# Patient Record
Sex: FEMALE | Race: BLACK OR AFRICAN AMERICAN | NOT HISPANIC OR LATINO | URBAN - METROPOLITAN AREA
[De-identification: names, ages, dates, MRNs, and addresses within clinical notes are randomized per-mention and may not be internally consistent; named-entity substitution may affect disease eponyms.]

---

## 2019-09-27 ENCOUNTER — INPATIENT (INPATIENT)
Facility: HOSPITAL | Age: 69
LOS: 2 days | Discharge: LEFT BEFORE DISCHARGE | DRG: 194 | End: 2019-09-30
Attending: INTERNAL MEDICINE | Admitting: INTERNAL MEDICINE
Payer: MEDICARE

## 2019-09-27 VITALS
RESPIRATION RATE: 18 BRPM | HEART RATE: 102 BPM | TEMPERATURE: 99 F | DIASTOLIC BLOOD PRESSURE: 69 MMHG | OXYGEN SATURATION: 99 % | SYSTOLIC BLOOD PRESSURE: 99 MMHG

## 2019-09-27 DIAGNOSIS — Z98.890 OTHER SPECIFIED POSTPROCEDURAL STATES: Chronic | ICD-10-CM

## 2019-09-27 LAB
ALBUMIN SERPL ELPH-MCNC: 3.5 G/DL — SIGNIFICANT CHANGE UP (ref 3.4–5)
ALP SERPL-CCNC: 41 U/L — SIGNIFICANT CHANGE UP (ref 40–120)
ALT FLD-CCNC: 16 U/L — SIGNIFICANT CHANGE UP (ref 12–42)
ANION GAP SERPL CALC-SCNC: 10 MMOL/L — SIGNIFICANT CHANGE UP (ref 9–16)
APPEARANCE UR: CLEAR — SIGNIFICANT CHANGE UP
APTT BLD: 28.9 SEC — SIGNIFICANT CHANGE UP (ref 27.5–36.3)
AST SERPL-CCNC: 44 U/L — HIGH (ref 15–37)
BILIRUB SERPL-MCNC: 2.1 MG/DL — HIGH (ref 0.2–1.2)
BILIRUB UR-MCNC: ABNORMAL
BUN SERPL-MCNC: 28 MG/DL — HIGH (ref 7–23)
CALCIUM SERPL-MCNC: 9 MG/DL — SIGNIFICANT CHANGE UP (ref 8.5–10.5)
CHLORIDE SERPL-SCNC: 107 MMOL/L — SIGNIFICANT CHANGE UP (ref 96–108)
CK SERPL-CCNC: 242 U/L — HIGH (ref 26–192)
CO2 SERPL-SCNC: 24 MMOL/L — SIGNIFICANT CHANGE UP (ref 22–31)
COLOR SPEC: YELLOW — SIGNIFICANT CHANGE UP
CREAT SERPL-MCNC: 1.53 MG/DL — HIGH (ref 0.5–1.3)
D DIMER BLD IA.RAPID-MCNC: HIGH NG/ML DDU
DIFF PNL FLD: ABNORMAL
GLUCOSE SERPL-MCNC: 96 MG/DL — SIGNIFICANT CHANGE UP (ref 70–99)
GLUCOSE UR QL: NEGATIVE — SIGNIFICANT CHANGE UP
HCT VFR BLD CALC: 28.7 % — LOW (ref 34.5–45)
HCT VFR BLD CALC: 29.4 % — LOW (ref 34.5–45)
HGB BLD-MCNC: 9.4 G/DL — LOW (ref 11.5–15.5)
HGB BLD-MCNC: 9.7 G/DL — LOW (ref 11.5–15.5)
INR BLD: 1.15 — SIGNIFICANT CHANGE UP (ref 0.88–1.16)
KETONES UR-MCNC: ABNORMAL MG/DL
LACTATE SERPL-SCNC: 0.7 MMOL/L — SIGNIFICANT CHANGE UP (ref 0.4–2)
LEUKOCYTE ESTERASE UR-ACNC: NEGATIVE — SIGNIFICANT CHANGE UP
MCHC RBC-ENTMCNC: 32.8 G/DL — SIGNIFICANT CHANGE UP (ref 32–36)
MCHC RBC-ENTMCNC: 33 G/DL — SIGNIFICANT CHANGE UP (ref 32–36)
MCHC RBC-ENTMCNC: 41.4 PG — HIGH (ref 27–34)
MCHC RBC-ENTMCNC: 42.2 PG — HIGH (ref 27–34)
MCV RBC AUTO: 126.4 FL — HIGH (ref 80–100)
MCV RBC AUTO: 127.8 FL — HIGH (ref 80–100)
NITRITE UR-MCNC: NEGATIVE — SIGNIFICANT CHANGE UP
NT-PROBNP SERPL-SCNC: 4946 PG/ML — HIGH
PCP SPEC-MCNC: SIGNIFICANT CHANGE UP
PH UR: 5.5 — SIGNIFICANT CHANGE UP (ref 5–8)
PLATELET # BLD AUTO: 183 K/UL — SIGNIFICANT CHANGE UP (ref 150–400)
PLATELET # BLD AUTO: 195 K/UL — SIGNIFICANT CHANGE UP (ref 150–400)
POTASSIUM SERPL-MCNC: 4.7 MMOL/L — SIGNIFICANT CHANGE UP (ref 3.5–5.3)
POTASSIUM SERPL-SCNC: 4.7 MMOL/L — SIGNIFICANT CHANGE UP (ref 3.5–5.3)
PROT SERPL-MCNC: 8.8 G/DL — HIGH (ref 6.4–8.2)
PROT UR-MCNC: 100 MG/DL
PROTHROM AB SERPL-ACNC: 12.7 SEC — SIGNIFICANT CHANGE UP (ref 10–12.9)
RBC # BLD: 2.27 M/UL — LOW (ref 3.8–5.2)
RBC # BLD: 2.3 M/UL — LOW (ref 3.8–5.2)
RBC # FLD: 16.5 % — HIGH (ref 10.3–14.5)
RBC # FLD: 16.8 % — HIGH (ref 10.3–14.5)
SODIUM SERPL-SCNC: 141 MMOL/L — SIGNIFICANT CHANGE UP (ref 132–145)
SP GR SPEC: >=1.03 — SIGNIFICANT CHANGE UP (ref 1–1.03)
TROPONIN I SERPL-MCNC: <0.017 NG/ML — LOW (ref 0.02–0.06)
UROBILINOGEN FLD QL: 1 E.U./DL — SIGNIFICANT CHANGE UP
WBC # BLD: 6.4 K/UL — SIGNIFICANT CHANGE UP (ref 3.8–10.5)
WBC # BLD: 7.3 K/UL — SIGNIFICANT CHANGE UP (ref 3.8–10.5)
WBC # FLD AUTO: 6.4 K/UL — SIGNIFICANT CHANGE UP (ref 3.8–10.5)
WBC # FLD AUTO: 7.3 K/UL — SIGNIFICANT CHANGE UP (ref 3.8–10.5)

## 2019-09-27 PROCEDURE — 71260 CT THORAX DX C+: CPT | Mod: 26

## 2019-09-27 PROCEDURE — 99218: CPT

## 2019-09-27 PROCEDURE — 70450 CT HEAD/BRAIN W/O DYE: CPT | Mod: 26

## 2019-09-27 PROCEDURE — 74177 CT ABD & PELVIS W/CONTRAST: CPT | Mod: 26

## 2019-09-27 PROCEDURE — 93010 ELECTROCARDIOGRAM REPORT: CPT

## 2019-09-27 RX ORDER — SODIUM CHLORIDE 9 MG/ML
1000 INJECTION INTRAMUSCULAR; INTRAVENOUS; SUBCUTANEOUS ONCE
Refills: 0 | Status: COMPLETED | OUTPATIENT
Start: 2019-09-27 | End: 2019-09-27

## 2019-09-27 RX ORDER — AZITHROMYCIN 500 MG/1
500 TABLET, FILM COATED ORAL ONCE
Refills: 0 | Status: COMPLETED | OUTPATIENT
Start: 2019-09-27 | End: 2019-09-27

## 2019-09-27 RX ORDER — AZITHROMYCIN 500 MG/1
500 TABLET, FILM COATED ORAL EVERY 24 HOURS
Refills: 0 | Status: DISCONTINUED | OUTPATIENT
Start: 2019-09-28 | End: 2019-09-28

## 2019-09-27 RX ORDER — CEFTRIAXONE 500 MG/1
1000 INJECTION, POWDER, FOR SOLUTION INTRAMUSCULAR; INTRAVENOUS EVERY 24 HOURS
Refills: 0 | Status: COMPLETED | OUTPATIENT
Start: 2019-09-27 | End: 2019-09-27

## 2019-09-27 RX ORDER — AZITHROMYCIN 500 MG/1
TABLET, FILM COATED ORAL
Refills: 0 | Status: DISCONTINUED | OUTPATIENT
Start: 2019-09-27 | End: 2019-09-28

## 2019-09-27 RX ORDER — ASPIRIN/CALCIUM CARB/MAGNESIUM 324 MG
81 TABLET ORAL DAILY
Refills: 0 | Status: DISCONTINUED | OUTPATIENT
Start: 2019-09-27 | End: 2019-09-27

## 2019-09-27 RX ORDER — IOHEXOL 300 MG/ML
30 INJECTION, SOLUTION INTRAVENOUS ONCE
Refills: 0 | Status: COMPLETED | OUTPATIENT
Start: 2019-09-27 | End: 2019-09-27

## 2019-09-27 RX ADMIN — IOHEXOL 30 MILLILITER(S): 300 INJECTION, SOLUTION INTRAVENOUS at 16:35

## 2019-09-27 RX ADMIN — SODIUM CHLORIDE 1000 MILLILITER(S): 9 INJECTION INTRAMUSCULAR; INTRAVENOUS; SUBCUTANEOUS at 16:34

## 2019-09-27 RX ADMIN — Medication 81 MILLIGRAM(S): at 16:34

## 2019-09-27 RX ADMIN — SODIUM CHLORIDE 250 MILLILITER(S): 9 INJECTION INTRAMUSCULAR; INTRAVENOUS; SUBCUTANEOUS at 17:56

## 2019-09-27 RX ADMIN — AZITHROMYCIN 255 MILLIGRAM(S): 500 TABLET, FILM COATED ORAL at 23:56

## 2019-09-27 RX ADMIN — SODIUM CHLORIDE 1000 MILLILITER(S): 9 INJECTION INTRAMUSCULAR; INTRAVENOUS; SUBCUTANEOUS at 17:50

## 2019-09-27 RX ADMIN — SODIUM CHLORIDE 1000 MILLILITER(S): 9 INJECTION INTRAMUSCULAR; INTRAVENOUS; SUBCUTANEOUS at 19:00

## 2019-09-27 RX ADMIN — CEFTRIAXONE 100 MILLIGRAM(S): 500 INJECTION, POWDER, FOR SOLUTION INTRAMUSCULAR; INTRAVENOUS at 23:26

## 2019-09-27 NOTE — ED ADULT NURSE NOTE - NSIMPLEMENTINTERV_GEN_ALL_ED
Implemented All Fall with Harm Risk Interventions:  [place] to call system. Call bell, personal items and telephone within reach. Instruct patient to call for assistance. Room bathroom lighting operational. Non-slip footwear when patient is off stretcher. Physically safe environment: no spills, clutter or unnecessary equipment. Stretcher in lowest position, wheels locked, appropriate side rails in place. Provide visual cue, wrist band, yellow gown, etc. Monitor gait and stability. Monitor for mental status changes and reorient to person, place, and time. Review medications for side effects contributing to fall risk. Reinforce activity limits and safety measures with patient and family. Provide visual clues: red socks.

## 2019-09-27 NOTE — ED PROVIDER NOTE - OBJECTIVE STATEMENT
nearly passed out and also hit forehead when she fell today, recent abdominal surgery and has a lapartoromy scar but she cannot recall why she had the surgery, no fevers or chills, no nausea or vomiting, states the only meds she takes are Tylenol and Ibuprofen

## 2019-09-27 NOTE — ED PROVIDER NOTE - CARE PLAN
Principal Discharge DX:	Pre-syncope  Secondary Diagnosis:	H/O abdominal surgery  Secondary Diagnosis:	Anemia

## 2019-09-27 NOTE — ED ADULT TRIAGE NOTE - CHIEF COMPLAINT QUOTE
[place] c/o dizziness. States she fell and hit her head but insists she did not syncopize. Appears disheveled denies concussive sx is AAOx2, unsure of baseline mental status, oral temp 99.4

## 2019-09-27 NOTE — ED PROVIDER NOTE - CLINICAL SUMMARY MEDICAL DECISION MAKING FREE TEXT BOX
nearly passed out and also hit forehead when she fell today, recent abdominal surgery and has a lapartoromy scar but she cannot recall why she had the surgery, no fevers or chills, no nausea or vomiting, states the only meds she takes are Tylenol and Ibuprofen, patient has periumbilical abd pain and recent surgery so will CT, pt with head injury so will CT head, patient with andmeia and EKG changes, will order troponin and repeat, will place in CDU for diagnostic uncertainty and intensity

## 2019-09-27 NOTE — ED CDU PROVIDER DISPOSITION NOTE - CLINICAL COURSE
patient with syncope, head injury, anemia, RLL pneumonia, admit to Dr Escobar and Dr Munguia, also d/w Dr Saleem of medical telemetry and his team will eval on floor

## 2019-09-27 NOTE — ED ADULT NURSE NOTE - OBJECTIVE STATEMENT
s/p dizziness . unsure of syncope episode. pt with head injury. unsure of baseline mental ststaus. pt ia aox2. no complaints at this  time

## 2019-09-28 DIAGNOSIS — N17.9 ACUTE KIDNEY FAILURE, UNSPECIFIED: ICD-10-CM

## 2019-09-28 DIAGNOSIS — D64.9 ANEMIA, UNSPECIFIED: ICD-10-CM

## 2019-09-28 DIAGNOSIS — R09.89 OTHER SPECIFIED SYMPTOMS AND SIGNS INVOLVING THE CIRCULATORY AND RESPIRATORY SYSTEMS: ICD-10-CM

## 2019-09-28 DIAGNOSIS — J18.1 LOBAR PNEUMONIA, UNSPECIFIED ORGANISM: ICD-10-CM

## 2019-09-28 DIAGNOSIS — R55 SYNCOPE AND COLLAPSE: ICD-10-CM

## 2019-09-28 LAB
ALBUMIN SERPL ELPH-MCNC: 3.5 G/DL — SIGNIFICANT CHANGE UP (ref 3.3–5)
ALP SERPL-CCNC: 35 U/L — LOW (ref 40–120)
ALT FLD-CCNC: 10 U/L — SIGNIFICANT CHANGE UP (ref 10–45)
ANION GAP SERPL CALC-SCNC: 10 MMOL/L — SIGNIFICANT CHANGE UP (ref 5–17)
ANION GAP SERPL CALC-SCNC: 11 MMOL/L — SIGNIFICANT CHANGE UP (ref 5–17)
APPEARANCE UR: CLEAR — SIGNIFICANT CHANGE UP
APTT BLD: 28 SEC — SIGNIFICANT CHANGE UP (ref 27.5–36.3)
APTT BLD: 28.7 SEC — SIGNIFICANT CHANGE UP (ref 27.5–36.3)
AST SERPL-CCNC: 18 U/L — SIGNIFICANT CHANGE UP (ref 10–40)
BASOPHILS # BLD AUTO: 0.01 K/UL — SIGNIFICANT CHANGE UP (ref 0–0.2)
BASOPHILS # BLD AUTO: 0.01 K/UL — SIGNIFICANT CHANGE UP (ref 0–0.2)
BASOPHILS NFR BLD AUTO: 0.2 % — SIGNIFICANT CHANGE UP (ref 0–2)
BASOPHILS NFR BLD AUTO: 0.2 % — SIGNIFICANT CHANGE UP (ref 0–2)
BILIRUB SERPL-MCNC: 1.2 MG/DL — SIGNIFICANT CHANGE UP (ref 0.2–1.2)
BILIRUB UR-MCNC: NEGATIVE — SIGNIFICANT CHANGE UP
BLD GP AB SCN SERPL QL: NEGATIVE — SIGNIFICANT CHANGE UP
BLD GP AB SCN SERPL QL: NEGATIVE — SIGNIFICANT CHANGE UP
BUN SERPL-MCNC: 22 MG/DL — SIGNIFICANT CHANGE UP (ref 7–23)
BUN SERPL-MCNC: 25 MG/DL — HIGH (ref 7–23)
CALCIUM SERPL-MCNC: 8.3 MG/DL — LOW (ref 8.4–10.5)
CALCIUM SERPL-MCNC: 8.4 MG/DL — SIGNIFICANT CHANGE UP (ref 8.4–10.5)
CHLORIDE SERPL-SCNC: 107 MMOL/L — SIGNIFICANT CHANGE UP (ref 96–108)
CHLORIDE SERPL-SCNC: 112 MMOL/L — HIGH (ref 96–108)
CK SERPL-CCNC: 218 U/L — HIGH (ref 26–192)
CO2 SERPL-SCNC: 21 MMOL/L — LOW (ref 22–31)
CO2 SERPL-SCNC: 21 MMOL/L — LOW (ref 22–31)
COLOR SPEC: YELLOW — SIGNIFICANT CHANGE UP
CREAT SERPL-MCNC: 1.28 MG/DL — SIGNIFICANT CHANGE UP (ref 0.5–1.3)
CREAT SERPL-MCNC: 1.36 MG/DL — HIGH (ref 0.5–1.3)
DIFF PNL FLD: ABNORMAL
EOSINOPHIL # BLD AUTO: 0 K/UL — SIGNIFICANT CHANGE UP (ref 0–0.5)
EOSINOPHIL # BLD AUTO: 0 K/UL — SIGNIFICANT CHANGE UP (ref 0–0.5)
EOSINOPHIL NFR BLD AUTO: 0 % — SIGNIFICANT CHANGE UP (ref 0–6)
EOSINOPHIL NFR BLD AUTO: 0 % — SIGNIFICANT CHANGE UP (ref 0–6)
GLUCOSE BLDC GLUCOMTR-MCNC: 91 MG/DL — SIGNIFICANT CHANGE UP (ref 70–99)
GLUCOSE SERPL-MCNC: 84 MG/DL — SIGNIFICANT CHANGE UP (ref 70–99)
GLUCOSE SERPL-MCNC: 91 MG/DL — SIGNIFICANT CHANGE UP (ref 70–99)
GLUCOSE UR QL: NEGATIVE — SIGNIFICANT CHANGE UP
HBA1C BLD-MCNC: 5.2 % — SIGNIFICANT CHANGE UP (ref 4–5.6)
HCT VFR BLD CALC: 24.1 % — LOW (ref 34.5–45)
HCT VFR BLD CALC: 25.8 % — LOW (ref 34.5–45)
HCV AB S/CO SERPL IA: 0.1 S/CO — SIGNIFICANT CHANGE UP
HCV AB SERPL-IMP: SIGNIFICANT CHANGE UP
HGB BLD-MCNC: 7.9 G/DL — LOW (ref 11.5–15.5)
HGB BLD-MCNC: 8.4 G/DL — LOW (ref 11.5–15.5)
IMM GRANULOCYTES NFR BLD AUTO: 0.7 % — SIGNIFICANT CHANGE UP (ref 0–1.5)
IMM GRANULOCYTES NFR BLD AUTO: 0.9 % — SIGNIFICANT CHANGE UP (ref 0–1.5)
INR BLD: 1.19 — HIGH (ref 0.88–1.16)
INR BLD: 1.21 — HIGH (ref 0.88–1.16)
KETONES UR-MCNC: NEGATIVE — SIGNIFICANT CHANGE UP
LEUKOCYTE ESTERASE UR-ACNC: NEGATIVE — SIGNIFICANT CHANGE UP
LYMPHOCYTES # BLD AUTO: 0.57 K/UL — LOW (ref 1–3.3)
LYMPHOCYTES # BLD AUTO: 0.57 K/UL — LOW (ref 1–3.3)
LYMPHOCYTES # BLD AUTO: 12.6 % — LOW (ref 13–44)
LYMPHOCYTES # BLD AUTO: 14.1 % — SIGNIFICANT CHANGE UP (ref 13–44)
MAGNESIUM SERPL-MCNC: 1.6 MG/DL — SIGNIFICANT CHANGE UP (ref 1.6–2.6)
MAGNESIUM SERPL-MCNC: 1.7 MG/DL — SIGNIFICANT CHANGE UP (ref 1.6–2.6)
MCHC RBC-ENTMCNC: 32.6 GM/DL — SIGNIFICANT CHANGE UP (ref 32–36)
MCHC RBC-ENTMCNC: 32.8 GM/DL — SIGNIFICANT CHANGE UP (ref 32–36)
MCHC RBC-ENTMCNC: 41.8 PG — HIGH (ref 27–34)
MCHC RBC-ENTMCNC: 42 PG — HIGH (ref 27–34)
MCV RBC AUTO: 127.5 FL — HIGH (ref 80–100)
MCV RBC AUTO: 129 FL — HIGH (ref 80–100)
MONOCYTES # BLD AUTO: 0.24 K/UL — SIGNIFICANT CHANGE UP (ref 0–0.9)
MONOCYTES # BLD AUTO: 0.33 K/UL — SIGNIFICANT CHANGE UP (ref 0–0.9)
MONOCYTES NFR BLD AUTO: 5.3 % — SIGNIFICANT CHANGE UP (ref 2–14)
MONOCYTES NFR BLD AUTO: 8.1 % — SIGNIFICANT CHANGE UP (ref 2–14)
NEUTROPHILS # BLD AUTO: 3.11 K/UL — SIGNIFICANT CHANGE UP (ref 1.8–7.4)
NEUTROPHILS # BLD AUTO: 3.67 K/UL — SIGNIFICANT CHANGE UP (ref 1.8–7.4)
NEUTROPHILS NFR BLD AUTO: 76.9 % — SIGNIFICANT CHANGE UP (ref 43–77)
NEUTROPHILS NFR BLD AUTO: 81 % — HIGH (ref 43–77)
NITRITE UR-MCNC: NEGATIVE — SIGNIFICANT CHANGE UP
NRBC # BLD: 0 /100 WBCS — SIGNIFICANT CHANGE UP (ref 0–0)
NRBC # BLD: 0 /100 WBCS — SIGNIFICANT CHANGE UP (ref 0–0)
OB PNL STL: NEGATIVE — SIGNIFICANT CHANGE UP
PH UR: 5.5 — SIGNIFICANT CHANGE UP (ref 5–8)
PHOSPHATE SERPL-MCNC: 3.9 MG/DL — SIGNIFICANT CHANGE UP (ref 2.5–4.5)
PHOSPHATE SERPL-MCNC: 4 MG/DL — SIGNIFICANT CHANGE UP (ref 2.5–4.5)
PLATELET # BLD AUTO: 152 K/UL — SIGNIFICANT CHANGE UP (ref 150–400)
PLATELET # BLD AUTO: 174 K/UL — SIGNIFICANT CHANGE UP (ref 150–400)
POTASSIUM SERPL-MCNC: 3.8 MMOL/L — SIGNIFICANT CHANGE UP (ref 3.5–5.3)
POTASSIUM SERPL-MCNC: 4 MMOL/L — SIGNIFICANT CHANGE UP (ref 3.5–5.3)
POTASSIUM SERPL-SCNC: 3.8 MMOL/L — SIGNIFICANT CHANGE UP (ref 3.5–5.3)
POTASSIUM SERPL-SCNC: 4 MMOL/L — SIGNIFICANT CHANGE UP (ref 3.5–5.3)
PROT SERPL-MCNC: 7.3 G/DL — SIGNIFICANT CHANGE UP (ref 6–8.3)
PROT UR-MCNC: 30 MG/DL
PROTHROM AB SERPL-ACNC: 13.5 SEC — HIGH (ref 10–12.9)
PROTHROM AB SERPL-ACNC: 13.8 SEC — HIGH (ref 10–12.9)
RBC # BLD: 1.89 M/UL — LOW (ref 3.8–5.2)
RBC # BLD: 2 M/UL — LOW (ref 3.8–5.2)
RBC # FLD: 16.2 % — HIGH (ref 10.3–14.5)
RBC # FLD: 16.4 % — HIGH (ref 10.3–14.5)
RH IG SCN BLD-IMP: POSITIVE — SIGNIFICANT CHANGE UP
RH IG SCN BLD-IMP: POSITIVE — SIGNIFICANT CHANGE UP
SODIUM SERPL-SCNC: 139 MMOL/L — SIGNIFICANT CHANGE UP (ref 135–145)
SODIUM SERPL-SCNC: 143 MMOL/L — SIGNIFICANT CHANGE UP (ref 135–145)
SP GR SPEC: 1.01 — SIGNIFICANT CHANGE UP (ref 1–1.03)
TROPONIN I SERPL-MCNC: <0.017 NG/ML — LOW (ref 0.02–0.06)
TROPONIN T SERPL-MCNC: <0.01 NG/ML — SIGNIFICANT CHANGE UP (ref 0–0.01)
UROBILINOGEN FLD QL: 0.2 E.U./DL — SIGNIFICANT CHANGE UP
WBC # BLD: 4.05 K/UL — SIGNIFICANT CHANGE UP (ref 3.8–10.5)
WBC # BLD: 4.53 K/UL — SIGNIFICANT CHANGE UP (ref 3.8–10.5)
WBC # FLD AUTO: 4.05 K/UL — SIGNIFICANT CHANGE UP (ref 3.8–10.5)
WBC # FLD AUTO: 4.53 K/UL — SIGNIFICANT CHANGE UP (ref 3.8–10.5)

## 2019-09-28 PROCEDURE — 93010 ELECTROCARDIOGRAM REPORT: CPT

## 2019-09-28 PROCEDURE — 99291 CRITICAL CARE FIRST HOUR: CPT

## 2019-09-28 PROCEDURE — 99223 1ST HOSP IP/OBS HIGH 75: CPT | Mod: GC

## 2019-09-28 PROCEDURE — 93306 TTE W/DOPPLER COMPLETE: CPT | Mod: 26

## 2019-09-28 PROCEDURE — 93970 EXTREMITY STUDY: CPT | Mod: 26

## 2019-09-28 PROCEDURE — 99292 CRITICAL CARE ADDL 30 MIN: CPT | Mod: 25

## 2019-09-28 RX ORDER — ACETAMINOPHEN 500 MG
2 TABLET ORAL
Qty: 0 | Refills: 0 | DISCHARGE

## 2019-09-28 RX ORDER — ACETAMINOPHEN 500 MG
650 TABLET ORAL ONCE
Refills: 0 | Status: COMPLETED | OUTPATIENT
Start: 2019-09-28 | End: 2019-09-28

## 2019-09-28 RX ORDER — AZITHROMYCIN 500 MG/1
500 TABLET, FILM COATED ORAL EVERY 24 HOURS
Refills: 0 | Status: DISCONTINUED | OUTPATIENT
Start: 2019-09-28 | End: 2019-09-30

## 2019-09-28 RX ORDER — CEFTRIAXONE 500 MG/1
1000 INJECTION, POWDER, FOR SOLUTION INTRAMUSCULAR; INTRAVENOUS EVERY 24 HOURS
Refills: 0 | Status: DISCONTINUED | OUTPATIENT
Start: 2019-09-28 | End: 2019-09-30

## 2019-09-28 RX ORDER — IBUPROFEN 200 MG
1 TABLET ORAL
Qty: 0 | Refills: 0 | DISCHARGE

## 2019-09-28 RX ORDER — ACETAMINOPHEN 500 MG
650 TABLET ORAL EVERY 4 HOURS
Refills: 0 | Status: DISCONTINUED | OUTPATIENT
Start: 2019-09-28 | End: 2019-09-30

## 2019-09-28 RX ORDER — INFLUENZA VIRUS VACCINE 15; 15; 15; 15 UG/.5ML; UG/.5ML; UG/.5ML; UG/.5ML
0.5 SUSPENSION INTRAMUSCULAR ONCE
Refills: 0 | Status: DISCONTINUED | OUTPATIENT
Start: 2019-09-28 | End: 2019-09-30

## 2019-09-28 RX ORDER — HEPARIN SODIUM 5000 [USP'U]/ML
5000 INJECTION INTRAVENOUS; SUBCUTANEOUS THREE TIMES A DAY
Refills: 0 | Status: DISCONTINUED | OUTPATIENT
Start: 2019-09-28 | End: 2019-09-28

## 2019-09-28 RX ADMIN — CEFTRIAXONE 100 MILLIGRAM(S): 500 INJECTION, POWDER, FOR SOLUTION INTRAMUSCULAR; INTRAVENOUS at 23:19

## 2019-09-28 RX ADMIN — Medication 650 MILLIGRAM(S): at 07:04

## 2019-09-28 RX ADMIN — AZITHROMYCIN 500 MILLIGRAM(S): 500 TABLET, FILM COATED ORAL at 23:18

## 2019-09-28 RX ADMIN — Medication 650 MILLIGRAM(S): at 17:25

## 2019-09-28 RX ADMIN — Medication 650 MILLIGRAM(S): at 08:47

## 2019-09-28 RX ADMIN — Medication 650 MILLIGRAM(S): at 09:47

## 2019-09-28 RX ADMIN — Medication 650 MILLIGRAM(S): at 08:04

## 2019-09-28 RX ADMIN — Medication 650 MILLIGRAM(S): at 16:39

## 2019-09-28 NOTE — PROGRESS NOTE ADULT - SUBJECTIVE AND OBJECTIVE BOX
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 Patient reportedly was on her phone when she suddenly felt dizzy and fell forward hitting her head, denies LOC, visual changes or headache. Patient denies any preceding N/V, diaphoresis, palpitations, recent PND, orthopnea or LE edema.   In [facility], appeared disheveled and A+O x 2 (baseline unknown), found to have recent abdominal surgery with laparotomy scar but she cannot recall why she had the procedure. Patient admits to mild carla-umbilical pain, denies fever, chills, changes in PO intake or bowel regimen.     In [facility], BP: 99/69, HR:102, RR:18, Temp: 99.4F, O2 sat: 99% RA. EKG revealed Sinus tachycardia@100BPM with TWI in lateral leads, 1mm ST depression in lead III, . Troponin I negative x 2 sets. Labs notable for: H/H 9.4/28.7, D-dimer 11,174, BUN/Cr 28/1.53, BNP 4946. Urine tox negative.   CT head revealed no acute intracranial hemorrhage or transcortical infarct. Small left frontal scalp hematoma. Age-appropriate volume loss and chronic microangiopathic disease.  CT Chest/Abd/pelvis with IV contrast revealed patchy consolidation RLL. This could represent infection or aspiration. No evidence of central PE (as per verbal report from radiology). Dilated bowel loop extending from LUQ into RLQ, appears to be a small bowel loop. This may have been a surgically created bowel loop, as there appears to be surgical suture material surrounding it. It could be ileus or partial obstruction. There is a 5.0 cm low-density lesion in the left kidney that is more dense than a simple cyst. It could be a hyperdense cyst or solid lesion. Large fibroid uterus.    Patient treated with IVFs, Ceftriaxone 1g IV x 1 dose and Azithromycin 500mg IV x 1 dose for CAP. Patient initially transferred to medicine 4URIS, however repeat EKG revealed SR with multifocal P waves vs PAC’s.   Given syncope and abnormal EKG’s, stepped up to 5URIS for cardiac telemetry, serial cardiac enzymes and further cardiac work-up.               	  MEDICATIONS:  azithromycin  IVPB 500 milliGRAM(s) IV Intermittent every 24 hours  acetaminophen   Tablet .. 650 milliGRAM(s) Oral every 4 hours PRN  aluminum hydroxide/magnesium hydroxide/simethicone Suspension 30 milliLiter(s) Oral every 4 hours PRN      	    [PHYSICAL EXAM:  TELEMETRY:  T(C): 36.8 (09-28-19 @ 03:46), Max: 37.4 (09-27-19 @ 15:43)  HR: 107 (09-28-19 @ 03:46) (91 - 107)  BP: 116/75 (09-28-19 @ 03:46) (99/69 - 154/90)  RR: 20 (09-28-19 @ 03:46) (16 - 20)  SpO2: 94% (09-28-19 @ 03:46) (94% - 100%)  Wt(kg): --  I&O's Summary    Height (cm): 175.3 (09-28 @ 01:08)  Weight (kg): 63.9 (09-28 @ 01:08)  BMI (kg/m2): 20.8 (09-28 @ 01:08)  BSA (m2): 1.78 (09-28 @ 01:08)        Appearance: disheveled female in NAD  HEENT:   left frontal scalp hematoma noted  Neck: Supple, no JVD  Cardiovascular: RRR S1S2  Respiratory: RLL rales  Gastrointestinal:  +BS, soft, NT/ND  Extremities: warm, well perfused, no LE edema bilaterally  Neurologic: no focal neurodeficits    	        LABS:	 	  CARDIAC MARKERS:  Troponin I, Serum: <0.017 ng/mL (09-27 @ 23:18)  Troponin I, Serum: <0.017 ng/mL (09-27 @ 16:13)                            8.4    4.53  )-----------( 174      ( 28 Sep 2019 03:43 )             25.8     09-27    141  |  107  |  28<H>  ----------------------------<  96  4.7   |  24  |  1.53<H>    Ca    9.0      27 Sep 2019 16:13    TPro  8.8<H>  /  Alb  3.5  /  TBili  2.1<H>  /  DBili  x   /  AST  44<H>  /  ALT  16  /  AlkPhos  41  09-27    proBNP: Serum Pro-Brain Natriuretic Peptide: 4946 pg/mL (09-27 @ 16:13)    PT/INR - ( 27 Sep 2019 16:13 )   PT: 12.7 sec;   INR: 1.15     PTT - ( 27 Sep 2019 16:13 )  PTT:28.9 sec Cardiology PA Medicine Step Over Acceptance Note             69 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 Patient reportedly was on her phone when she suddenly felt dizzy and fell forward hitting her head, denies LOC, visual changes or headache. Patient denies any preceding N/V, diaphoresis, palpitations, recent PND, orthopnea or LE edema.   In [facility], appeared disheveled and A+O x 2 (baseline unknown), found to have recent abdominal surgery with laparotomy scar but she cannot recall why she had the procedure. Patient admits to mild carla-umbilical pain, denies fever, chills, changes in PO intake or bowel regimen.     In [facility], BP: 99/69, HR:102, RR:18, Temp: 99.4F, O2 sat: 99% RA. EKG revealed Sinus tachycardia@100BPM with TWI in lateral leads, 1mm ST depression in lead III, . Troponin I negative x 2 sets. Labs notable for: H/H 9.4/28.7, D-dimer 11,174, BUN/Cr 28/1.53, BNP 4946. Urine tox negative.   CT head revealed no acute intracranial hemorrhage or transcortical infarct. Small left frontal scalp hematoma. Age-appropriate volume loss and chronic microangiopathic disease.  CT Chest/Abd/pelvis with IV contrast revealed patchy consolidation RLL. This could represent infection or aspiration. No evidence of central PE (as per verbal report from radiology). Dilated bowel loop extending from LUQ into RLQ, appears to be a small bowel loop. This may have been a surgically created bowel loop, as there appears to be surgical suture material surrounding it. It could be ileus or partial obstruction. There is a 5.0 cm low-density lesion in the left kidney that is more dense than a simple cyst. It could be a hyperdense cyst or solid lesion. Large fibroid uterus.    Patient treated with IVFs, Ceftriaxone 1g IV x 1 dose and Azithromycin 500mg IV x 1 dose for CAP. Patient initially transferred to medicine 4URIS, however repeat EKG revealed SR with multifocal P waves vs PAC’s.   Given syncope and abnormal EKG’s, stepped up to 5URIS for cardiac telemetry, serial cardiac enzymes and further cardiac work-up.               	  MEDICATIONS:  azithromycin  IVPB 500 milliGRAM(s) IV Intermittent every 24 hours  acetaminophen   Tablet .. 650 milliGRAM(s) Oral every 4 hours PRN  aluminum hydroxide/magnesium hydroxide/simethicone Suspension 30 milliLiter(s) Oral every 4 hours PRN      	    [PHYSICAL EXAM:  TELEMETRY:  T(C): 36.8 (09-28-19 @ 03:46), Max: 37.4 (09-27-19 @ 15:43)  HR: 107 (09-28-19 @ 03:46) (91 - 107)  BP: 116/75 (09-28-19 @ 03:46) (99/69 - 154/90)  RR: 20 (09-28-19 @ 03:46) (16 - 20)  SpO2: 94% (09-28-19 @ 03:46) (94% - 100%)  Wt(kg): --  I&O's Summary    Height (cm): 175.3 (09-28 @ 01:08)  Weight (kg): 63.9 (09-28 @ 01:08)  BMI (kg/m2): 20.8 (09-28 @ 01:08)  BSA (m2): 1.78 (09-28 @ 01:08)        Appearance: well appearing female in NAD  HEENT:   left frontal scalp hematoma noted  Neck: Supple, no JVD  Cardiovascular: RRR S1S2  Respiratory: RLL rales  Gastrointestinal:  +BS, soft, NT/ND  Extremities: warm, well perfused, no LE edema bilaterally  Neurologic: no focal neurodeficits    	        LABS:	 	  CARDIAC MARKERS:  Troponin I, Serum: <0.017 ng/mL (09-27 @ 23:18)  Troponin I, Serum: <0.017 ng/mL (09-27 @ 16:13)                            8.4    4.53  )-----------( 174      ( 28 Sep 2019 03:43 )             25.8     09-27    141  |  107  |  28<H>  ----------------------------<  96  4.7   |  24  |  1.53<H>    Ca    9.0      27 Sep 2019 16:13    TPro  8.8<H>  /  Alb  3.5  /  TBili  2.1<H>  /  DBili  x   /  AST  44<H>  /  ALT  16  /  AlkPhos  41  09-27    proBNP: Serum Pro-Brain Natriuretic Peptide: 4946 pg/mL (09-27 @ 16:13)    PT/INR - ( 27 Sep 2019 16:13 )   PT: 12.7 sec;   INR: 1.15     PTT - ( 27 Sep 2019 16:13 )  PTT:28.9 sec Cardiology PA Medicine Step Over Acceptance Note            69 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 Patient reportedly was on her phone when she suddenly felt dizzy and fell forward hitting her head, denies LOC, visual changes or headache. Patient denies any preceding N/V, diaphoresis, chest pain, palpitations, recent PND, orthopnea or LE edema. Patient admits to mild carla-umbilical pain, denies fever, chills, changes in PO intake or bowel regimen.   In [facility], BP: 99/69, HR:102, RR:18, Temp: 99.4F, O2 sat: 99% RA. EKG revealed Sinus tachycardia@100BPM with TWI in lateral leads, 1mm ST depression in lead III, . Troponin I negative x 2 sets. Labs notable for: H/H 9.4/28.7, D-dimer 11,174, BUN/Cr 28/1.53, BNP 4946. Urine tox negative.   CT head revealed no acute intracranial hemorrhage or transcortical infarct. Small left frontal scalp hematoma. Age-appropriate volume loss and chronic microangiopathic disease.  CT Chest/Abd/pelvis with IV contrast revealed patchy consolidation RLL. This could represent infection or aspiration. No evidence of central PE (as per verbal report from radiology). Dilated bowel loop extending from LUQ into RLQ, appears to be a small bowel loop. This may have been a surgically created bowel loop, as there appears to be surgical suture material surrounding it. It could be ileus or partial obstruction. There is a 5.0 cm low-density lesion in the left kidney that is more dense than a simple cyst. It could be a hyperdense cyst or solid lesion. Large fibroid uterus.    Patient treated with IVFs, Ceftriaxone 1g IV x 1 dose and Azithromycin 500mg IV x 1 dose for CAP. Patient initially transferred to medicine 4URIS, however repeat EKG revealed SR with multifocal P waves vs PAC’s.   Given syncope and abnormal EKG’s, stepped up to 5URIS for cardiac telemetry, serial cardiac enzymes and further cardiac work-up.               	  MEDICATIONS:  azithromycin  IVPB 500 milliGRAM(s) IV Intermittent every 24 hours  acetaminophen   Tablet .. 650 milliGRAM(s) Oral every 4 hours PRN  aluminum hydroxide/magnesium hydroxide/simethicone Suspension 30 milliLiter(s) Oral every 4 hours PRN      	    [PHYSICAL EXAM:  TELEMETRY: 5 beats of NSVT  T(C): 36.8 (09-28-19 @ 03:46), Max: 37.4 (09-27-19 @ 15:43)  HR: 107 (09-28-19 @ 03:46) (91 - 107)  BP: 116/75 (09-28-19 @ 03:46) (99/69 - 154/90)  RR: 20 (09-28-19 @ 03:46) (16 - 20)  SpO2: 94% (09-28-19 @ 03:46) (94% - 100%)  Wt(kg): --  I&O's Summary    Height (cm): 175.3 (09-28 @ 01:08)  Weight (kg): 63.9 (09-28 @ 01:08)  BMI (kg/m2): 20.8 (09-28 @ 01:08)  BSA (m2): 1.78 (09-28 @ 01:08)        Appearance: well appearing female in NAD  HEENT:   left frontal scalp hematoma noted  Neck: Supple, no JVD  Cardiovascular: RRR S1S2 +III/VI JULIA  Respiratory: CTA upper/mid lobes with RLL rales  Gastrointestinal:  +BS, soft, NT/ND  Extremities: warm, well perfused, no LE edema bilaterally  Neurologic: no focal neurodeficits    	        LABS:	 	  CARDIAC MARKERS:  Troponin I, Serum: <0.017 ng/mL (09-27 @ 23:18)  Troponin I, Serum: <0.017 ng/mL (09-27 @ 16:13)                            8.4    4.53  )-----------( 174      ( 28 Sep 2019 03:43 )             25.8     09-27    141  |  107  |  28<H>  ----------------------------<  96  4.7   |  24  |  1.53<H>    Ca    9.0      27 Sep 2019 16:13    TPro  8.8<H>  /  Alb  3.5  /  TBili  2.1<H>  /  DBili  x   /  AST  44<H>  /  ALT  16  /  AlkPhos  41  09-27    proBNP: Serum Pro-Brain Natriuretic Peptide: 4946 pg/mL (09-27 @ 16:13)    PT/INR - ( 27 Sep 2019 16:13 )   PT: 12.7 sec;   INR: 1.15     PTT - ( 27 Sep 2019 16:13 )  PTT:28.9 sec Cardiology PA Medicine Step Over Acceptance Note            69 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 Patient reportedly was on her phone when she suddenly felt dizzy and fell forward hitting her head, denies LOC, visual changes or headache. Patient denies any preceding N/V, diaphoresis, chest pain, palpitations, recent PND, orthopnea or LE edema. Patient admits to mild carla-umbilical pain, denies fever, chills, changes in PO intake or bowel regimen.   In [facility], BP: 99/69, HR:102, RR:18, Temp: 99.4F, O2 sat: 99% RA. EKG revealed Sinus tachycardia@100BPM with TWI in lateral leads, 1mm ST depression in lead III, . Troponin I negative x 2 sets. Labs notable for: H/H 9.4/28.7, D-dimer 11,174, BUN/Cr 28/1.53, BNP 4946. Urine tox negative.   CT head revealed no acute intracranial hemorrhage or transcortical infarct. Small left frontal scalp hematoma. Age-appropriate volume loss and chronic microangiopathic disease.  CT Chest/Abd/pelvis with IV contrast revealed patchy consolidation RLL. This could represent infection or aspiration. No evidence of central PE (as per verbal report from radiology). Dilated bowel loop extending from LUQ into RLQ, appears to be a small bowel loop. This may have been a surgically created bowel loop, as there appears to be surgical suture material surrounding it. It could be ileus or partial obstruction. There is a 5.0 cm low-density lesion in the left kidney that is more dense than a simple cyst. It could be a hyperdense cyst or solid lesion. Large fibroid uterus.    Patient treated with IVFs, Ceftriaxone 1g IV x 1 dose and Azithromycin 500mg IV x 1 dose for CAP. Patient initially transferred to medicine 4URIS, however repeat EKG revealed SR with wandering atrial pacemaker vs PAC’s.   Given syncope and abnormal EKG’s, stepped up to 5URIS for cardiac telemetry, serial cardiac enzymes and further cardiac work-up.               	  MEDICATIONS:  azithromycin  IVPB 500 milliGRAM(s) IV Intermittent every 24 hours  acetaminophen   Tablet .. 650 milliGRAM(s) Oral every 4 hours PRN  aluminum hydroxide/magnesium hydroxide/simethicone Suspension 30 milliLiter(s) Oral every 4 hours PRN      	    [PHYSICAL EXAM:  TELEMETRY: 5 beats of NSVT  T(C): 36.8 (09-28-19 @ 03:46), Max: 37.4 (09-27-19 @ 15:43)  HR: 107 (09-28-19 @ 03:46) (91 - 107)  BP: 116/75 (09-28-19 @ 03:46) (99/69 - 154/90)  RR: 20 (09-28-19 @ 03:46) (16 - 20)  SpO2: 94% (09-28-19 @ 03:46) (94% - 100%)  Wt(kg): --  I&O's Summary    Height (cm): 175.3 (09-28 @ 01:08)  Weight (kg): 63.9 (09-28 @ 01:08)  BMI (kg/m2): 20.8 (09-28 @ 01:08)  BSA (m2): 1.78 (09-28 @ 01:08)        Appearance: well appearing female in NAD  HEENT:   left frontal scalp hematoma noted  Neck: Supple, no JVD  Cardiovascular: RRR S1S2 +III/VI JULIA  Respiratory: CTA upper/mid lobes with RLL rales  Gastrointestinal:  +BS, soft, NT/ND  Extremities: warm, well perfused, no LE edema bilaterally  Neurologic: no focal neurodeficits    	        LABS:	 	  CARDIAC MARKERS:  Troponin I, Serum: <0.017 ng/mL (09-27 @ 23:18)  Troponin I, Serum: <0.017 ng/mL (09-27 @ 16:13)                            8.4    4.53  )-----------( 174      ( 28 Sep 2019 03:43 )             25.8     09-27    141  |  107  |  28<H>  ----------------------------<  96  4.7   |  24  |  1.53<H>    Ca    9.0      27 Sep 2019 16:13    TPro  8.8<H>  /  Alb  3.5  /  TBili  2.1<H>  /  DBili  x   /  AST  44<H>  /  ALT  16  /  AlkPhos  41  09-27    proBNP: Serum Pro-Brain Natriuretic Peptide: 4946 pg/mL (09-27 @ 16:13)    PT/INR - ( 27 Sep 2019 16:13 )   PT: 12.7 sec;   INR: 1.15     PTT - ( 27 Sep 2019 16:13 )  PTT:28.9 sec

## 2019-09-28 NOTE — PROGRESS NOTE ADULT - PROBLEM SELECTOR PLAN 1
currently asymptomatic, initial EKG revealed Sinus tachycardia@100BPM with TWI in lateral leads, 1mm ST depression in lead III, . Repeat EKGs SR with multifocal P waves vs frequent PACs. Troponin I negative x 2 sets.  --CT head negative for acute findings.  --D-dimer elevated 11,174, CT Chest with contrast negative for central PE as per prelim report from radiology. Will obtain bilateral LE duplex.  --Not[facility] to [facility]  --will continue cardiac telemetry and obtain Echocardiogram in AM. currently asymptomatic, initial EKG revealed Sinus tachycardia@100BPM with TWI in lateral leads, 1mm ST depression in lead III, . Repeat EKGs SR with multifocal P waves/wandering atrial pacemaker vs frequent PACs. Troponin I negative x 2 sets.  --CT head negative for acute findings.  --D-dimer elevated 11,174, CT Chest with contrast negative for central PE as per prelim report from radiology. Will obtain bilateral LE duplex.  --Noted to have 5 beats of NSVT upon arrival to [facility] ~4AM.  --will continue cardiac telemetry and obtain Echocardiogram in AM.

## 2019-09-28 NOTE — H&P ADULT - NSHPLABSRESULTS_GEN_ALL_CORE
.  LABS:                         7.9    4.05  )-----------( 152      ( 28 Sep 2019 07:36 )             24.1     09-28    143  |  112<H>  |  22  ----------------------------<  91  4.0   |  21<L>  |  1.28    Ca    8.3<L>      28 Sep 2019 07:36  Phos  4.0     09-28  Mg     1.7     09-28    TPro  7.3  /  Alb  3.5  /  TBili  1.2  /  DBili  x   /  AST  18  /  ALT  10  /  AlkPhos  35<L>  09-28    PT/INR - ( 28 Sep 2019 07:36 )   PT: 13.8 sec;   INR: 1.21          PTT - ( 28 Sep 2019 07:36 )  PTT:28.0 sec  Urinalysis Basic - ( 27 Sep 2019 16:42 )    Color: Yellow / Appearance: Clear / SG: >=1.030 / pH: x  Gluc: x / Ketone: Trace mg/dL  / Bili: Small / Urobili: 1.0 E.U./dL   Blood: x / Protein: 100 mg/dL / Nitrite: NEGATIVE   Leuk Esterase: NEGATIVE / RBC: < 5 /HPF / WBC < 5 /HPF   Sq Epi: x / Non Sq Epi: 0-5 /HPF / Bacteria: Many /HPF      CARDIAC MARKERS ( 28 Sep 2019 03:43 )  x     / <0.01 ng/mL / 173 U/L / x     / 3.7 ng/mL  CARDIAC MARKERS ( 27 Sep 2019 23:18 )  <0.017 ng/mL / x     / 218 U/L / x     / x      CARDIAC MARKERS ( 27 Sep 2019 16:13 )  <0.017 ng/mL / x     / 242 U/L / x     / x            Lactate, Blood: 0.7 mmoL/L (09-27 @ 23:17)      RADIOLOGY, EKG & ADDITIONAL TESTS: Reviewed.     CT chest/abd/pelvis:  Impression: 1. Patchy consolidation right lower lobe. This could   represent infection or aspiration.  2. Dilated bowel loop extending from left upper quadrant into right lower   quadrant, appears to be a small bowel loop. This may have been a   surgically created bowel loop, as there appears to be surgical suture   material surrounding it. It could be ileus or partial obstruction.   Recommend correlation with surgical history and/or prior imaging.  3. Cholelithiasis.  4. There is a 5.0 cm low-density lesion in the left kidney that is more   dense than a simple cyst. It could be a hyperdense cyst or solid lesion.   Recommend ultrasound as first step in evaluation.  5. Large fibroid uterus.    Reached overnight for comment, radiology resident on call remarks that CT chest with IV contrast actually shows pulmonary vasculature and arteries, there is no massive or submassive PE noted, though small subsegmental or segmental cannot be excluded without CT chest PE protocol.

## 2019-09-28 NOTE — PROGRESS NOTE ADULT - ASSESSMENT
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 Troponin I negative x 2 sets. Labs notable for: H/H 9.4/28.7, D-dimer 11,174, BUN/Cr 28/1.53, BNP 4946. Urine tox negative.   CT head revealed no acute findings.CT Chest with IV contrast revealed patchy consolidation RLL. No evidence of central PE (as per verbal report from radiology).     Patient treated with IVFs, Ceftriaxone 1g IV x 1 dose and Azithromycin 500mg IV x 1 dose for CAP. Patient initially transferred to medicine 4URIS, however repeat EKG revealed SR with multifocal P waves vs PAC’s.   Given syncope and abnormal EKG’s, stepped up to 5URIS for cardiac telemetry, serial cardiac enzymes and further cardiac work-up. 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 Troponin I negative x 2 sets. Labs notable for: H/H 9.4/28.7, D-dimer 11,174, BUN/Cr 28/1.53, BNP 4946. Urine tox negative.   CT head revealed no acute findings.CT Chest with IV contrast revealed patchy consolidation RLL. No evidence of central PE (as per verbal report from radiology).     Patient treated with IVFs, Ceftriaxone 1g IV x 1 dose and Azithromycin 500mg IV x 1 dose for CAP. Patient initially transferred to medicine 4URIS, however repeat EKG revealed SR with wandering atrial pacemaker vs frequent PAC’s.   Given syncope and abnormal EKG’s, stepped up to 5URIS for cardiac telemetry, serial cardiac enzymes and further cardiac work-up.

## 2019-09-28 NOTE — PROGRESS NOTE ADULT - PROBLEM SELECTOR PLAN 4
BUN/Cr 28/1.53 with improvement to 25/1.36 after IVFs, baseline Cr unknown. UA with small bilirubin, trace ketone, 100 protein, moderate blood, many bacteria, few hyaline casts.  --F/U urine electrolytes and avoid nephrotoxic agents.  --CT Abd/Pelvis revealed a 5.0 cm low-density lesion in the left kidney that is more dense than a simple cyst. It could be a hyperdense cyst or solid lesion.   - f/u renal US  - repeat UA    VTE PPx: will order SCDs after LE duplex. Holding off medical PPx due to anemia.   PT consulted: F/U recs

## 2019-09-28 NOTE — H&P ADULT - NSHPREVIEWOFSYSTEMS_GEN_ALL_CORE
REVIEW OF SYSTEMS:    CONSTITUTIONAL: Patient affirms mild headache; denies weakness, fevers or chills  EYES/ENT: Patient denies visual changes; denies vertigo or throat pain   NECK: Patient denies pain or stiffness  RESPIRATORY: Patient denies cough, wheezing, hemoptysis; denies shortness of breath  CARDIOVASCULAR: Patient denies chest pain or palpitations  GASTROINTESTINAL: Patient denies abdominal or epigastric pain, nausea, vomiting, or hematemesis, diarrhea or constipation, melena or hematochezia.  GENITOURINARY: Patient denies dysuria, frequency or gross hematuria  NEUROLOGICAL: Patient denies focal numbness or weakness  SKIN: Patient denies itching, burning, rashes, or lesions   All other review of systems is negative unless indicated above.

## 2019-09-28 NOTE — PROGRESS NOTE ADULT - PROBLEM SELECTOR PLAN 2
currently afebrile with leukocytosis, CT chest with patchy RLL suspicious for CAP.  --received Ceftriaxone 1g IV x 1 dose and Azithromycin 500mg IV x 1 dose in [facility]  --will continue Ceftriaxone 1g IV daily and Azithromycin 250mg PO daily x 4 days.  --F/U blood cultures. currently afebrile with leukocytosis, CT chest with patchy RLL suspicious for CAP.  --received Ceftriaxone 1g IV x 1 dose and Azithromycin 500mg IV x 1 dose in [facility]  --will continue Ceftriaxone 1g IV daily and Azithromycin 500mg PO daily x 4 days.  --F/U blood cultures.

## 2019-09-28 NOTE — H&P ADULT - HISTORY OF PRESENT ILLNESS
Mrs. Welsh is a 70 yo F with PMH of abd surgery for "stomach problems" (circa 2010), admission to outside hospital for "slow heart rate" (circa 2018), with frequent relocations secondary to undomiciled status causing loss to follow-up, who now presents after syncope. On 9/27/19 the patient was making a telephone call outside on the street, which she clearly remembers. She then had a slight feeling of dizziness. She lost consciousness, and woke a few seconds later, when she struck her forehead. She clearly remembers then events afterward and reports feeling soreness in her left hip and buttock, as well as headache. She remembers being picked up by EMS and being brought to [facility] ED shortly afterward.     ED Course: 99.4 F, 102, 99/69, 18, 99% on RA. CTH without contrast showed no acute pathology; CT chest/abd/pelvis notable for small lung nodules, possible aspiration vs PNA in RLL, sizeable renal solid mass vs hyperdense cyst, a Mrs. Welsh is a 68 yo F with PMH of abd surgery for "stomach problems" (circa 2010), admission to outside hospital for "slow heart rate" (circa 2018), with frequent relocations secondary to undomiciled status causing loss to follow-up, who now presents after syncope. On 9/27/19 the patient was making a telephone call outside on the street, which she clearly remembers. She then had a slight feeling of dizziness. She lost consciousness, and woke a few seconds later, when she struck her forehead. She clearly remembers then events afterward and reports feeling soreness in her left hip and buttock, as well as headache. She remembers being picked up by EMS and being brought to [facility] ED shortly afterward.     ED Course: 99.4 F, 102, 99/69, 18, 99% on RA. Labs most notable for anemia (Hgb 9.4) with severe macrocytosis (), with minimal elevation in INR (1.2) but marked elevation in D-dimer (11k) and BNP 5k; UA with blood, urobili, proteinuria, as well as both granular and hyaline casts. CTH without contrast showed no acute pathology; CT chest/abd/pelvis notable for small lung nodules, possible aspiration vs PNA in RLL, sizeable renal solid mass vs hyperdense cyst without hydronephrosis.

## 2019-09-28 NOTE — H&P ADULT - ASSESSMENT
Ms. Welsh is a 68 yo F with poor medical follow-up secondary to her undomiciled (sheltered) status, who presents after suspected cardiogenic syncope, for transfer to cardiac telemetry for further evaluation and management.     # Syncope  Patient reports suddenly passing out while making a telephone call in daylight hours, which was associated only with mild "dizziness" and resulted in a head injury significant enough to leave a scalp hematoma visible on CT. She has no history of drug or alcohol use, and denies history of non-accidental trauma relating to this incident. She remembers the events clearly other than a few seconds of complete unconsciousness, and had no symptoms suggestive of seizure and no post-ictal state. Given her history of a "slow heart rate" and being discharged with a probable external ICD device with close cardiac follow-up which was subsequently lost, she is being transferred to cardiac telemetry under cardiology care. While no PE is evident on her CT chest, her extremely high D-dimer means PE definitely cannot be excluded.  - trend EKG  - trend cardiac enzymes  - echocardiogram    # Suspected acute kidney injury vs chronic kidney disease  Increased BUN and Cr are mild and proportional. Favoring acute kidney injury is presence of granular casts on UA, specifically ATN, but CKD cannot be excluded given lack of collateral lab info.   - urine lytes to calculate FeNa  - consider repeat UA    # Hypodense renal mass (5 cm)  - consider f/u retroperitoneal ultrasound as per radiology recs to evaluate  - most likely a cyst but need to exclude other masses  - unlikely to be related to kidney injury given lack of hydronephrosis    # Macrocytic anemia  Massively elevated MCV in setting of anemia. Patient denies any history of anemia and is not aware of this problem. Of note, however, is her history of "stomach problems" and her open abdominal surgery about 10 years ago. 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

## 2019-09-28 NOTE — PROGRESS NOTE ADULT - PROBLEM SELECTOR PLAN 1
currently asymptomatic, initial EKG revealed Sinus tachycardia@100BPM with TWI in lateral leads, 1mm ST depression in lead III, . Repeat EKGs SR with multifocal P waves/wandering atrial pacemaker vs frequent PACs. Troponin I negative x 2 sets.  --CT head negative for acute findings, present Small left frontal scalp hematoma   --D-dimer elevated 11,174, CT Chest with contrast negative for central PE as per prelim report from radiology. Will obtain bilateral LE duplex.  --Noted to have 5 beats of NSVT upon arrival to [facility] ~4AM.  --will continue cardiac telemetry   - Echocardiogram performed. Will follow up the official read currently asymptomatic, initial EKG revealed Sinus tachycardia@100BPM with TWI in lateral leads, 1mm ST depression in lead III, . Repeat EKGs SR with multifocal P waves/wandering atrial pacemaker vs frequent PACs. Troponin I negative x 2 sets.  --CT head negative for acute findings, present Small left frontal scalp hematoma   --D-dimer elevated 11,174, CT Chest with contrast negative for central PE as per prelim report from radiology. Will obtain bilateral LE duplex.  --Noted to have 5 beats of NSVT upon arrival to [facility] ~4AM.  --will continue cardiac telemetry   - Echocardiogram 9/28: inferior wall severe hypokinesis, EF 40-45%, mod MR, small pericardial effusion

## 2019-09-28 NOTE — CONSULT NOTE ADULT - SUBJECTIVE AND OBJECTIVE BOX
Ms. Welsh is a 69 year old female who was brought in to [facility] after falling and hitting her head earlier today.  The patient denied any prodromal symptoms such as chest pain, shortness of breath or palpitations.  When she was brought in to [facility] her vital signs were BP 99/69, , RR 18, temperature 99.4 degrees Farenheit and saturating 99% on room air.  Of note, patient's utox was negative, CT head was negative and troponin was negative x2.  Patient was found to have RLL infiltrate for which she received ceftriaxone/azithromycin.  ICU was consulted for possible admission to medical telemetry but patient was deemed stable for regional medical floor.    Upon arrival to [email or web address]], patient's vitals were /70, HR 91, RR 18, saturating 100% and temperature 98.4 degrees Farenheit.  Exam is notable for elderly female asleep in NAD, RRR S1 S2, no murmurs, rubs or gallops, patient's respirations unlabored conversive in full sentences, midline abdominal scar.  Patient can remain on regional medical floor and ICU team will continue to monitor, reconsult with any further questions.

## 2019-09-28 NOTE — PROGRESS NOTE ADULT - SUBJECTIVE AND OBJECTIVE BOX
TRANSFER NOTE: REGIONAL MEDICAL FLOOR TO CARDIAC TELEMETRY FLOOR    Mrs. Welsh is a 70 yo F with PMH of abd surgery for "stomach problems" (circa 2010), admission to outside hospital for "slow heart rate" (circa 2018), with frequent relocations secondary to undomiciled status causing loss to follow-up, who now presents after syncope. On 9/27/19 the patient was making a telephone call outside on the street, which she clearly remembers. She then had a slight feeling of dizziness. She lost consciousness, and woke a few seconds later, when she struck her forehead. She clearly remembers then events afterward and reports feeling soreness in her left hip and buttock, as well as headache. She remembers being picked up by EMS and being brought to [facility] ED shortly afterward. ED Course: 99.4 F, 102, 99/69, 18, 99% on RA. Labs most notable for anemia (Hgb 9.4) with severe macrocytosis (), with minimal elevation in INR (1.2) but marked elevation in D-dimer (11k) and BNP 5k; UA with blood, urobili, proteinuria, as well as both granular and hyaline casts. CTH without contrast showed no acute pathology; CT chest/abd/pelvis notable for small lung nodules, possible aspiration vs PNA in RLL, sizeable renal solid mass vs hyperdense cyst without hydronephrosis. Seen by ICU consult who declined medical eval. After further history regarding possible ICD and arrhythmia elicited, patient determined to require transfer to cardiac telemetry and was accepted by Dr. Johnston.     VITAL SIGNS:  Vital Signs Last 24 Hrs  T(C): 36.7 (28 Sep 2019 08:49), Max: 38.3 (28 Sep 2019 07:45)  T(F): 98 (28 Sep 2019 08:49), Max: 101 (28 Sep 2019 07:45)  HR: 94 (28 Sep 2019 08:49) (91 - 107)  BP: 98/70 (28 Sep 2019 08:49) (92/61 - 154/90)  BP(mean): 80 (28 Sep 2019 08:49) (72 - 80)  RR: 16 (28 Sep 2019 08:49) (16 - 20)  SpO2: 99% (28 Sep 2019 08:49) (94% - 100%)    PHYSICAL EXAM:  	Constitutional: Adult Black female, WDWN resting comfortably in bed; NAD  	Head: small scalp hematoma L frontal scalp  	Eyes: PERRL, EOMI, anicteric sclera  	ENT: MMM  	Neck: marked JVD  	Respiratory: CTA B/L; no W/R/R  	Cardiac: RRR; no murmur  	Gastrointestinal: soft, NT/ND; no rebound or guarding; midline abd incision noted, well-healed, no hernia  	Extremities: WWP, no cyanosis; 1+ edema to at least the knee  	Musculoskeletal: no joint swelling, tenderness or erythema; no point tenderness of L hip  	Vascular: 2+ radial, PT pulses bilaterally; varicosities of veins in lower extremities  	Dermatologic: skin warm, dry and intact; no rash  	Neurologic: AAOx3; no cranial nerve deficits; no gross focal deficits  Psychiatric: affect and characteristics of appearance, verbalizations, behaviors are appropriate    MEDICATIONS:  MEDICATIONS  (STANDING):  azithromycin   Tablet 500 milliGRAM(s) Oral every 24 hours  cefTRIAXone   IVPB 1000 milliGRAM(s) IV Intermittent every 24 hours  influenza   Vaccine 0.5 milliLiter(s) IntraMuscular once    MEDICATIONS  (PRN):  acetaminophen   Tablet .. 650 milliGRAM(s) Oral every 4 hours PRN Mild Pain (1 - 3)  acetaminophen   Tablet .. 650 milliGRAM(s) Oral once PRN Temp greater or equal to 38.5C (101.3F)  aluminum hydroxide/magnesium hydroxide/simethicone Suspension 30 milliLiter(s) Oral every 4 hours PRN Dyspepsia      ALLERGIES:  Allergies    No Known Allergies    Intolerances        LABS:                        7.9    4.05  )-----------( 152      ( 28 Sep 2019 07:36 )             24.1     09-28    143  |  112<H>  |  22  ----------------------------<  91  4.0   |  21<L>  |  1.28    Ca    8.3<L>      28 Sep 2019 07:36  Phos  4.0     09-28  Mg     1.7     09-28    TPro  7.3  /  Alb  3.5  /  TBili  1.2  /  DBili  x   /  AST  18  /  ALT  10  /  AlkPhos  35<L>  09-28    PT/INR - ( 28 Sep 2019 07:36 )   PT: 13.8 sec;   INR: 1.21          PTT - ( 28 Sep 2019 07:36 )  PTT:28.0 sec  Urinalysis Basic - ( 27 Sep 2019 16:42 )    Color: Yellow / Appearance: Clear / SG: >=1.030 / pH: x  Gluc: x / Ketone: Trace mg/dL  / Bili: Small / Urobili: 1.0 E.U./dL   Blood: x / Protein: 100 mg/dL / Nitrite: NEGATIVE   Leuk Esterase: NEGATIVE / RBC: < 5 /HPF / WBC < 5 /HPF   Sq Epi: x / Non Sq Epi: 0-5 /HPF / Bacteria: Many /HPF      CAPILLARY BLOOD GLUCOSE      POCT Blood Glucose.: 91 mg/dL (28 Sep 2019 06:55)      RADIOLOGY & ADDITIONAL TESTS: Reviewed.

## 2019-09-28 NOTE — PROGRESS NOTE ADULT - PROBLEM SELECTOR PLAN 4
BUN/Cr 28/1.53 with improvement to 25/1.36 after IVFs, baseline Cr unknown. UA with small bilirubin, trace ketone, 100 protein, moderate blood, many bacteria, few hyaline casts.  --F/U urine electrolytes and avoid nephrotoxic agents.    VTE PPx: will order SCDs after LE duplex. Holding off medical PPx due to anemia.   PT consulted: F/U recs BUN/Cr 28/1.53 with improvement to 25/1.36 after IVFs, baseline Cr unknown. UA with small bilirubin, trace ketone, 100 protein, moderate blood, many bacteria, few hyaline casts.  --F/U urine electrolytes and avoid nephrotoxic agents.  --CT Abd/Pelvis revealed a 5.0 cm low-density lesion in the left kidney that is more dense than a simple cyst. It could be a hyperdense cyst or solid lesion. Consider US.    VTE PPx: will order SCDs after LE duplex. Holding off medical PPx due to anemia.   PT consulted: F/U recs
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
## 2019-09-28 NOTE — H&P ADULT - NSICDXPASTMEDICALHX_GEN_ALL_CORE_FT
PAST MEDICAL HISTORY:  Bradyarrhythmia Previously needed hospital stay and possible Life Vest, lost to cardiac follow-up

## 2019-09-28 NOTE — PROGRESS NOTE ADULT - SUBJECTIVE AND OBJECTIVE BOX
Interventional Cardiology PA Adult Progress Note    cc: syncope    Subjective Assessment: Seen and examined bedside. Denies any symptoms at this time  	     ROS negative except per subjective and HPI    MEDICATIONS:    azithromycin   Tablet 500 milliGRAM(s) Oral every 24 hours  cefTRIAXone   IVPB 1000 milliGRAM(s) IV Intermittent every 24 hours      acetaminophen   Tablet .. 650 milliGRAM(s) Oral every 4 hours PRN  acetaminophen   Tablet .. 650 milliGRAM(s) Oral once PRN    aluminum hydroxide/magnesium hydroxide/simethicone Suspension 30 milliLiter(s) Oral every 4 hours PRN      influenza   Vaccine 0.5 milliLiter(s) IntraMuscular once      	    [PHYSICAL EXAM:  TELEMETRY:  T(C): 36.7 (09-28-19 @ 08:49), Max: 38.3 (09-28-19 @ 07:45)  HR: 94 (09-28-19 @ 08:49) (91 - 107)  BP: 98/70 (09-28-19 @ 08:49) (92/61 - 154/90)  RR: 16 (09-28-19 @ 08:49) (16 - 20)  SpO2: 99% (09-28-19 @ 08:49) (94% - 100%)  Wt(kg): --  I&O's Summary    27 Sep 2019 07:01  -  28 Sep 2019 07:00  --------------------------------------------------------  IN: 150 mL / OUT: 0 mL / NET: 150 mL    28 Sep 2019 07:01  -  28 Sep 2019 12:21  --------------------------------------------------------  IN: 200 mL / OUT: 0 mL / NET: 200 mL      Height (cm): 175.3 (09-28 @ 01:08)  Weight (kg): 63.9 (09-28 @ 01:08)  BMI (kg/m2): 20.8 (09-28 @ 01:08)  BSA (m2): 1.78 (09-28 @ 01:08)  Grady:  Central/PICC/Mid Line:                                         Appearance: Normal	  HEENT:   Normal oral mucosa, PERRL, EOMI	  Neck: Supple, - JVD;  Cardiovascular: Normal S1 S2, No JVD, No murmurs,   Respiratory: Lungs clear to auscultation, No Rales, Rhonchi, Wheezing	  Gastrointestinal:  Soft, Non-tender, bowel sounds present	  Skin: No rashes, No ecchymoses, No cyanosis  Extremities: Normal range of motion, No clubbing, cyanosis or edema  Neurologic: Non-focal  Psychiatry: A & O x 3, Mood & affect appropriate      	      	  RADIOLOGY:   DIAGNOSTIC TESTING:  [ ] Echocardiogram:    OTHER: 	    LABS:	 	  CARDIAC MARKERS:  Troponin I, Serum: <0.017 ng/mL (09-27 @ 23:18)  Troponin I, Serum: <0.017 ng/mL (09-27 @ 16:13)          Troponin I, Serum: <0.017 ng/mL (09-27 @ 23:18)  Troponin I, Serum: <0.017 ng/mL (09-27 @ 16:13)                          7.9    4.05  )-----------( 152      ( 28 Sep 2019 07:36 )             24.1     09-28    143  |  112<H>  |  22  ----------------------------<  91  4.0   |  21<L>  |  1.28    Ca    8.3<L>      28 Sep 2019 07:36  Phos  4.0     09-28  Mg     1.7     09-28    TPro  7.3  /  Alb  3.5  /  TBili  1.2  /  DBili  x   /  AST  18  /  ALT  10  /  AlkPhos  35<L>  09-28    proBNP: Serum Pro-Brain Natriuretic Peptide: 4946 pg/mL (09-27 @ 16:13)    Lipid Profile:   HgA1c: Hemoglobin A1C, Whole Blood: 5.2 % (09-28 @ 03:43)    TSH: Thyroid Stimulating Hormone, Serum: 1.041 uIU/mL (09-28 @ 03:43)    PT/INR - ( 28 Sep 2019 07:36 )   PT: 13.8 sec;   INR: 1.21          PTT - ( 28 Sep 2019 07:36 )  PTT:28.0 sec Interventional Cardiology PA Adult Progress Note    cc: syncope    Subjective Assessment: Seen and examined bedside. Denies any symptoms at this time  	     ROS negative except per subjective and HPI    MEDICATIONS:    azithromycin   Tablet 500 milliGRAM(s) Oral every 24 hours  cefTRIAXone   IVPB 1000 milliGRAM(s) IV Intermittent every 24 hours      acetaminophen   Tablet .. 650 milliGRAM(s) Oral every 4 hours PRN  acetaminophen   Tablet .. 650 milliGRAM(s) Oral once PRN    aluminum hydroxide/magnesium hydroxide/simethicone Suspension 30 milliLiter(s) Oral every 4 hours PRN      influenza   Vaccine 0.5 milliLiter(s) IntraMuscular once      	    [PHYSICAL EXAM:  TELEMETRY:  T(C): 36.7 (09-28-19 @ 08:49), Max: 38.3 (09-28-19 @ 07:45)  HR: 94 (09-28-19 @ 08:49) (91 - 107)  BP: 98/70 (09-28-19 @ 08:49) (92/61 - 154/90)  RR: 16 (09-28-19 @ 08:49) (16 - 20)  SpO2: 99% (09-28-19 @ 08:49) (94% - 100%)  Wt(kg): --  I&O's Summary    27 Sep 2019 07:01  -  28 Sep 2019 07:00  --------------------------------------------------------  IN: 150 mL / OUT: 0 mL / NET: 150 mL    28 Sep 2019 07:01  -  28 Sep 2019 12:21  --------------------------------------------------------  IN: 200 mL / OUT: 0 mL / NET: 200 mL      Height (cm): 175.3 (09-28 @ 01:08)  Weight (kg): 63.9 (09-28 @ 01:08)  BMI (kg/m2): 20.8 (09-28 @ 01:08)  BSA (m2): 1.78 (09-28 @ 01:08)  Grady:  Central/PICC/Mid Line:                                         Appearance: Normal	  HEENT:   Normal oral mucosa, PERRL, EOMI	  Neck: Supple, - JVD;  Cardiovascular: Normal S1 S2, No JVD, No murmurs,   Respiratory: Lungs clear to auscultation, No Rales, Rhonchi, Wheezing	  Gastrointestinal:  Soft, Non-tender, bowel sounds present	  Skin: No rashes, No ecchymoses, No cyanosis  Extremities: Normal range of motion, No clubbing, cyanosis or edema  Neurologic: Non-focal  Psychiatry: A & O x 3, Mood & affect appropriate      	      	  RADIOLOGY:   DIAGNOSTIC TESTING:  [ ] Echocardiogram:  < from: Echocardiogram (09.28.19 @ 09:34) >   1. There is mild symmetric left ventricular hypertrophy.The inferior   wall appears severely hypokinetic. Ejection fraction is    40-45%.   2. Normal right ventricular size and systolic function.   3. The mitral leaflets appear thickened and tethered. There is moderate   mitral regurgitation.   4. Tricuspid insufficiency is at least mild to moderate. Flow noted in   the superior aspect of the right atrium and IVC that likely reflects   prominent IVC inflow, however a greater eccentric tricuspid regurgitation   jet may be underappreciated. No evidence of pulmonary hypertension.   5. No evidence of pulmonary hypertension.   6. Small pericardial effusion without evidence of cardiac tamponade.    < end of copied text >      OTHER: 	    LABS:	 	  CARDIAC MARKERS:  Troponin I, Serum: <0.017 ng/mL (09-27 @ 23:18)  Troponin I, Serum: <0.017 ng/mL (09-27 @ 16:13)          Troponin I, Serum: <0.017 ng/mL (09-27 @ 23:18)  Troponin I, Serum: <0.017 ng/mL (09-27 @ 16:13)                          7.9    4.05  )-----------( 152      ( 28 Sep 2019 07:36 )             24.1     09-28    143  |  112<H>  |  22  ----------------------------<  91  4.0   |  21<L>  |  1.28    Ca    8.3<L>      28 Sep 2019 07:36  Phos  4.0     09-28  Mg     1.7     09-28    TPro  7.3  /  Alb  3.5  /  TBili  1.2  /  DBili  x   /  AST  18  /  ALT  10  /  AlkPhos  35<L>  09-28    proBNP: Serum Pro-Brain Natriuretic Peptide: 4946 pg/mL (09-27 @ 16:13)    Lipid Profile:   HgA1c: Hemoglobin A1C, Whole Blood: 5.2 % (09-28 @ 03:43)    TSH: Thyroid Stimulating Hormone, Serum: 1.041 uIU/mL (09-28 @ 03:43)    PT/INR - ( 28 Sep 2019 07:36 )   PT: 13.8 sec;   INR: 1.21          PTT - ( 28 Sep 2019 07:36 )  PTT:28.0 sec

## 2019-09-28 NOTE — PROGRESS NOTE ADULT - ASSESSMENT
69 yr old F POOR HISTORIAN, shelter resident, with poor medical follow-up, FHx of heart disease, PMHx of  "weak heart/slow heart rhythm" (admitted to [facility] 6/2019, was discharged with a lifevest as per patient) BIBEMS to [facility] 9/27/19 [place], [place]: 99/69,  EKG revealed Sinus tachycardia@100BPM with TWI in lateral leads, 1mm ST depression in lead III, . Troponin I negative x 2 sets. Labs notable for: H/H 9.4/28.7, D-dimer 11,174, BUN/Cr 28/1.53, BNP 4946. Urine tox negative.   CT head revealed no acute findings.CT Chest with IV contrast revealed patchy consolidation RLL. No evidence of central PE (as per verbal report from radiology).     Patient treated with IVFs, Ceftriaxone 1g IV x 1 dose and Azithromycin 500mg IV x 1 dose for CAP. Patient initially transferred to medicine 4UR[facility], however repeat EKG revealed SR with wandering atrial pacemaker vs frequent PAC’s.   Given syncope and abnormal EKG’s, stepped up to 5URIS for cardiac telemetry, serial cardiac enzymes and further cardiac work-up. 69 yr old F POOR HISTORIAN, shelter resident, with poor medical follow-up, FHx of heart disease, PMHx of  "weak heart/slow heart rhythm" (admitted to [facility] 6/2019, was discharged with a lifevest as per patient) BIBEMS to [facility] 9/27/19 [place], BP: 99/69,  EKG revealed Sinus tachycardia@100BPM with TWI in lateral leads, 1mm ST depression in lead III, . Troponin I negative x 2 sets. Labs notable for: H/H 9.4/28.7, D-dimer 11,174, BUN/Cr 28/1.53, BNP 4946. Urine tox negative.   CT head revealed no acute findings.CT Chest with IV contrast revealed patchy consolidation RLL. No evidence of central PE (as per verbal report from radiology).     Patient treated with IVFs, Ceftriaxone 1g IV x 1 dose and Azithromycin 500mg IV x 1 dose for CAP. Patient initially transferred to medicine 4URIS, however repeat EKG revealed SR with wandering atrial pacemaker vs frequent PAC’s.   Given syncope and abnormal EKG’s, stepped up to 5URIS for cardiac telemetry, serial cardiac enzymes and further cardiac work-up.   Will need  shelter packet when ready for discharge    Dispo: pending medical and cardiac clearance

## 2019-09-28 NOTE — H&P ADULT - NSHPPHYSICALEXAM_GEN_ALL_CORE
VITAL SIGNS:  T(C): 36.7 (09-28-19 @ 08:49), Max: 38.3 (09-28-19 @ 07:45)  T(F): 98 (09-28-19 @ 08:49), Max: 101 (09-28-19 @ 07:45)  HR: 94 (09-28-19 @ 08:49) (91 - 107)  BP: 98/70 (09-28-19 @ 08:49) (92/61 - 154/90)  BP(mean): 80 (09-28-19 @ 08:49) (72 - 80)  RR: 16 (09-28-19 @ 08:49) (16 - 20)  SpO2: 99% (09-28-19 @ 08:49) (94% - 100%)  Wt(kg): --    PHYSICAL EXAM:    Constitutional: Adult Black female, WDWN resting comfortably in bed; NAD  Head: small scalp hematoma L frontal scalp  Eyes: PERRL, EOMI, anicteric sclera  ENT: MMM  Neck: marked JVD  Respiratory: CTA B/L; no W/R/R  Cardiac: RRR; no murmur  Gastrointestinal: soft, NT/ND; no rebound or guarding; midline abd incision noted, well-healed, no hernia  Extremities: WWP, no cyanosis; 1+ edema to at least the knee  Musculoskeletal: no joint swelling, tenderness or erythema; no point tenderness of L hip  Vascular: 2+ radial, PT pulses bilaterally; varicosities of veins in lower extremities  Dermatologic: skin warm, dry and intact; no rash  Neurologic: AAOx3; no cranial nerve deficits; no gross focal deficits  Psychiatric: affect and characteristics of appearance, verbalizations, behaviors are appropriate

## 2019-09-28 NOTE — PROGRESS NOTE ADULT - PROBLEM SELECTOR PLAN 2
- febrile with normal WBC, CT chest with patchy RLL suspicious for CAP.  --received Ceftriaxone 1g IV x 1 dose and Azithromycin 500mg IV x 1 dose in [facility]  --will continue Ceftriaxone 1g IV daily and Azithromycin 500mg PO daily x 4 days.  --F/U blood cultures drawn 9/28 (today)

## 2019-09-29 LAB
ANION GAP SERPL CALC-SCNC: 8 MMOL/L — SIGNIFICANT CHANGE UP (ref 5–17)
BLD GP AB SCN SERPL QL: NEGATIVE — SIGNIFICANT CHANGE UP
BUN SERPL-MCNC: 18 MG/DL — SIGNIFICANT CHANGE UP (ref 7–23)
CALCIUM SERPL-MCNC: 8.9 MG/DL — SIGNIFICANT CHANGE UP (ref 8.4–10.5)
CHLORIDE SERPL-SCNC: 110 MMOL/L — HIGH (ref 96–108)
CO2 SERPL-SCNC: 24 MMOL/L — SIGNIFICANT CHANGE UP (ref 22–31)
CREAT SERPL-MCNC: 1.45 MG/DL — HIGH (ref 0.5–1.3)
FIBRINOGEN PPP-MCNC: 138 MG/DL — LOW (ref 258–438)
GLUCOSE SERPL-MCNC: 86 MG/DL — SIGNIFICANT CHANGE UP (ref 70–99)
HAPTOGLOB SERPL-MCNC: 68 MG/DL — SIGNIFICANT CHANGE UP (ref 34–200)
HCT VFR BLD CALC: 27.4 % — LOW (ref 34.5–45)
HGB BLD-MCNC: 8.6 G/DL — LOW (ref 11.5–15.5)
LDH SERPL L TO P-CCNC: 506 U/L — HIGH (ref 50–242)
MAGNESIUM SERPL-MCNC: 1.8 MG/DL — SIGNIFICANT CHANGE UP (ref 1.6–2.6)
MCHC RBC-ENTMCNC: 31.4 GM/DL — LOW (ref 32–36)
MCHC RBC-ENTMCNC: 42 PG — HIGH (ref 27–34)
MCV RBC AUTO: 133.7 FL — HIGH (ref 80–100)
NRBC # BLD: 2 /100 WBCS — HIGH (ref 0–0)
PLATELET # BLD AUTO: 153 K/UL — SIGNIFICANT CHANGE UP (ref 150–400)
POTASSIUM SERPL-MCNC: 4.3 MMOL/L — SIGNIFICANT CHANGE UP (ref 3.5–5.3)
POTASSIUM SERPL-SCNC: 4.3 MMOL/L — SIGNIFICANT CHANGE UP (ref 3.5–5.3)
RBC # BLD: 2.05 M/UL — LOW (ref 3.8–5.2)
RBC # FLD: 16.3 % — HIGH (ref 10.3–14.5)
RH IG SCN BLD-IMP: POSITIVE — SIGNIFICANT CHANGE UP
SODIUM SERPL-SCNC: 142 MMOL/L — SIGNIFICANT CHANGE UP (ref 135–145)
TROPONIN T SERPL-MCNC: <0.01 NG/ML — SIGNIFICANT CHANGE UP (ref 0–0.01)
WBC # BLD: 2.69 K/UL — LOW (ref 3.8–10.5)
WBC # FLD AUTO: 2.69 K/UL — LOW (ref 3.8–10.5)

## 2019-09-29 PROCEDURE — 99233 SBSQ HOSP IP/OBS HIGH 50: CPT

## 2019-09-29 RX ORDER — AZITHROMYCIN 500 MG/1
1 TABLET, FILM COATED ORAL
Qty: 4 | Refills: 0
Start: 2019-09-29 | End: 2019-10-02

## 2019-09-29 RX ORDER — MAGNESIUM OXIDE 400 MG ORAL TABLET 241.3 MG
400 TABLET ORAL ONCE
Refills: 0 | Status: COMPLETED | OUTPATIENT
Start: 2019-09-29 | End: 2019-09-29

## 2019-09-29 RX ADMIN — AZITHROMYCIN 500 MILLIGRAM(S): 500 TABLET, FILM COATED ORAL at 21:16

## 2019-09-29 RX ADMIN — MAGNESIUM OXIDE 400 MG ORAL TABLET 400 MILLIGRAM(S): 241.3 TABLET ORAL at 12:50

## 2019-09-29 RX ADMIN — CEFTRIAXONE 100 MILLIGRAM(S): 500 INJECTION, POWDER, FOR SOLUTION INTRAMUSCULAR; INTRAVENOUS at 21:16

## 2019-09-29 RX ADMIN — Medication 650 MILLIGRAM(S): at 12:08

## 2019-09-29 RX ADMIN — Medication 650 MILLIGRAM(S): at 13:08

## 2019-09-29 NOTE — PHYSICAL THERAPY INITIAL EVALUATION ADULT - GENERAL OBSERVATIONS, REHAB EVAL
Patient received semi-supine no acute distress +telemetry +SCDs, cleared for PT by JUSTO Clancy (covering), agreeable to PT Eval. Left as found +call adriana, JUSTO Clancy aware. FIM 7

## 2019-09-29 NOTE — PROGRESS NOTE ADULT - ASSESSMENT
69 yr old F POOR HISTORIAN, shelter resident, with poor medical follow-up, FHx of heart disease, PMHx of  "weak heart/slow heart rhythm" (admitted to [facility] 6/2019, was discharged with a lifevest as per patient) BIBEMS to [facility] 9/27/19 [place], w/ EKG revealing sinus Tach, TWI in lateral leads, 1mm ST depression in lead III, , Trop negative, D dimer 11.174, CT Head negative, CT Chest negative for PE but concerning for PNA, admitted to [facility] medicine service, started on IV Abx for treatment of PNA, w/ hospital course complicated by repeat EKG concerning for wandering atrial pacemaker vs frequent PAC’s, and transferred to [facility] for telemetry monitoring.

## 2019-09-29 NOTE — PROGRESS NOTE ADULT - PROBLEM SELECTOR PLAN 4
ALEX, unknown baseline.  On admit BUN/Cr 28/1.53, showed improvement after IV fluids.   - Today stable 18/1.45.   - UA with small bilirubin, trace ketone, 100 protein, moderate blood, many bacteria, few hyaline casts.  - F/U urine electrolytes and avoid nephrotoxic agents.  - CT Abd/Pelvis revealed a 5.0 cm low-density lesion in the left kidney that is more dense than a simple cyst. It could be a hyperdense cyst or solid lesion.   - f/u renal US    Dispo: SW to assist w/ discharge back to shelter.

## 2019-09-29 NOTE — PROGRESS NOTE ADULT - PROBLEM SELECTOR PLAN 3
Anemia w/ unknown baseline.  Hg ranging 7.9 - 9.7  - remains stable today H/H 8.6/27.4. H/H 8.4/25.8  - admits to history of anemia, labs consistent with macrocytic anemia. Patient denies BRBPR/melena/or signs of active bleeding.  - stool occult negative.   - F/U Vit B 12 level/Folate/intrincic antibody
H/H 8.4/25.8, yesterday 9.4/28.7-->9.7/29.4 (baseline unknown), admits to history of anemia, labs consistent with macrocytic anemia. Patient denies BRBPR/melena/or signs of active bleeding.  --will F/U Vit B 12 level/Folate/intrincic antibody and stool occult.
H/H 9.4/28.7-->9.7/29.4 (baseline unknown), admits to history of anemia, labs consistent with macrocytic anemia. Patient denies BRBPR/melena/or signs of active bleeding.  --will F/U Vit B 12 level/Folate and stool occult.

## 2019-09-29 NOTE — PROGRESS NOTE ADULT - PROBLEM SELECTOR PLAN 1
currently asymptomatic,   - initial EKG revealed Sinus tachycardia@100BPM with TWI in lateral leads, 1mm ST depression in lead III, . Repeat EKGs SR with multifocal P waves/wandering atrial pacemaker vs frequent PACs.  - Trop negative x2.   - CT head negative for acute findings, present Small left frontal scalp hematoma   - D-dimer elevated 11,174, CT Chest with contrast negative for central PE as per prelim report from radiology.  LE duplex negative for DVT.   - Echocardiogram 9/28: inferior wall severe hypokinesis, EF 40-45%, mod MR, small pericardial effusion  - Telemetry: 5 beats NSVT, some tachycardia.   - continue to monitor telemetry  - Plan for further work up as outpatient if patient follows up with Dr. Jonhston to prove compliance w/ f/u.

## 2019-09-29 NOTE — DISCHARGE NOTE PROVIDER - CARE PROVIDER_API CALL
Niyah Johnston)  Cardiovascular Disease; Internal Medicine  [address] [id number]  Phone: [phone]  Fax: [phone]  Follow Up Time:

## 2019-09-29 NOTE — DISCHARGE NOTE PROVIDER - HOSPITAL COURSE
69 yr old F POOR HISTORIAN, shelter resident, with poor medical follow-up, FHx of heart disease, PMHx of  "weak heart/slow heart rhythm" (admitted to [facility] 6/2019, was discharged with a lifevest as per patient) [facility] 9[facility]/19 [place], w/ EKG revealing sinus Tach, TWI in lateral leads, 1mm ST depression in lead III, .  Labs were significant for Trop negative, D dimer 11.174.  CT Head negative for acute findings.  Subsequent CT Chest negative for PE but concerning for RLL patchy suspicious for CAP.  Patient transferred and admitted to [facility] under medicine service for syncope and treatment of PNA.  Patient started on Ceftriaxone IV and Azithromycin PO with plan for 4 day course.  blood cultures negative to date, no leukocytosis, and afebrile. LE duplex negative for DVT.   During hospital course patient with EKG concerning for wandering atrial pacemaker vs frequent PAC’s, and transferred to  uris for telemetry monitoring.  Cardiac enzyme remained negative.  No significant events on telemetry.  Echocardiogram 9/28: inferior wall severe hypokinesis, EF 40-45%, mod MR, small pericardial effusion.  During hospital course patient noted to be anemia, likely near baseline, started anemia work up, stool occult negative.  Patient w/ elevated Creatnine during hospital course w/ unknown baseline.  CT Abd/Pelvis revealed a 5.0 cm low-density lesion in the left kidney that is more dense than a simple cyst. It could be a hyperdense cyst or solid lesion.  Patient recommended to follow up as outpatient for management of CKD.  Patient was recommended to stay for complete course of IV abx, and completion of packet to return back to her shelter but patient states she has an important meeting to attend on 9/30 in AM.  Labs, vitals, Meds reviewed with Dr. Johnston and patient deemed stable for discharge home early AM on 9/30 with continuing of outpatient oral Abx and follow up with the Cardiologist Dr. Johnston for further outpatient evaluation if she proves she can follow up.  Azithromycin 250mg PO x4 days E prescribed to pharmacy of choice.          ** Of note patient is discharged home w/o appropriate paperwork to return to shelter.  Patient is aware of this and understands a chance of not being accepted back to her shelter.  she is willing to be discharged without appropriate paperwork being sent as she states she meeting is very important. Social work made aware of discharge plan.

## 2019-09-29 NOTE — PHYSICAL THERAPY INITIAL EVALUATION ADULT - DISCHARGE DISPOSITION, PT EVAL
home/no skilled PT needs/patient demonstrates independence with all basic mobility and is cleared by PT, safe for d/c home at this time. FIM 7

## 2019-09-29 NOTE — DISCHARGE NOTE PROVIDER - NSDCCPCAREPLAN_GEN_ALL_CORE_FT
PRINCIPAL DISCHARGE DIAGNOSIS  Diagnosis: Pneumonia of right lower lobe due to infectious organism  Assessment and Plan of Treatment: Please continue Azithromycin 250mg daily for 4 days total.  Prescription sent to [facility] on [address].   - Please follow up with your primary care doctor.      SECONDARY DISCHARGE DIAGNOSES  Diagnosis: Pre-syncope  Assessment and Plan of Treatment: - Please follow up with the Cardiologist Dr. Johnston in 1-2 weeks.  It is important that you follow up for continued managment of your heart disease with concern for an abnormal heart rythm.   - YOur Echocardiogram showed slightly reduced heart function.  Please weigh your self daily.  maintain a low salt diet.        Diagnosis: Chronic kidney disease  Assessment and Plan of Treatment: - Please follow up with your primary care doctor.  YOu have kidney dysfunction and it is important that you follow up your doctor to monitor your kidney function on a regular basis.   - CT scan of your kidney showed at 5.o cm low density lesion in the left kidney which could be a simple cyst or lesion.    Diagnosis: Anemia  Assessment and Plan of Treatment: Please follow up with your primary care doctor for further management of anemia

## 2019-09-29 NOTE — PHYSICAL THERAPY INITIAL EVALUATION ADULT - PERTINENT HX OF CURRENT PROBLEM, REHAB EVAL
Mrs. Welsh is a 70 yo F with PMH of abd surgery for "stomach problems" (circa 2010), admission to outside hospital for "slow heart rate" (circa 2018), with frequent relocations secondary to undomiciled status causing loss to follow-up, who now presents after syncope. On 9/27/19 the patient was making a telephone call outside on the street, which she clearly remembers. She then had a slight feeling of dizziness. She lost consciousness, and woke a few seconds later, when she struck her forehead.

## 2019-09-29 NOTE — DISCHARGE NOTE PROVIDER - NSDCFUADDAPPT_GEN_ALL_CORE_FT
- Please follow up with Dr. Johnston in 1-2 weeks.  It is important that you follow up with the cardiologist to continue your heart work up.

## 2019-09-29 NOTE — PHYSICAL THERAPY INITIAL EVALUATION ADULT - LIVES WITH, PROFILE
undomiciled, typically stays in shelter, reports she does stairs without issues at baseline often when navigating community

## 2019-09-29 NOTE — PROGRESS NOTE ADULT - PROBLEM SELECTOR PLAN 2
Continued cough, no leukocytosis, afebrile today ( Temp 100 on 9/28)  - CT chest with patchy RLL suspicious for CAP.  - continue Ceftriaxone 1g IV daily and Azithromycin 500mg PO daily x 4 days. (Last dose 9/30).   - blood CX NGTD

## 2019-09-30 VITALS — WEIGHT: 144.4 LBS | TEMPERATURE: 97 F

## 2019-09-30 LAB
ANION GAP SERPL CALC-SCNC: 11 MMOL/L — SIGNIFICANT CHANGE UP (ref 5–17)
BUN SERPL-MCNC: 21 MG/DL — SIGNIFICANT CHANGE UP (ref 7–23)
CALCIUM SERPL-MCNC: 9 MG/DL — SIGNIFICANT CHANGE UP (ref 8.4–10.5)
CHLORIDE SERPL-SCNC: 108 MMOL/L — SIGNIFICANT CHANGE UP (ref 96–108)
CO2 SERPL-SCNC: 20 MMOL/L — LOW (ref 22–31)
CREAT SERPL-MCNC: 1.19 MG/DL — SIGNIFICANT CHANGE UP (ref 0.5–1.3)
GLUCOSE SERPL-MCNC: 95 MG/DL — SIGNIFICANT CHANGE UP (ref 70–99)
HCT VFR BLD CALC: 28.9 % — LOW (ref 34.5–45)
HGB BLD-MCNC: 9.6 G/DL — LOW (ref 11.5–15.5)
IF BLOCK AB SER-ACNC: SIGNIFICANT CHANGE UP
MAGNESIUM SERPL-MCNC: 1.8 MG/DL — SIGNIFICANT CHANGE UP (ref 1.6–2.6)
MCHC RBC-ENTMCNC: 33.2 GM/DL — SIGNIFICANT CHANGE UP (ref 32–36)
MCHC RBC-ENTMCNC: 43 PG — HIGH (ref 27–34)
MCV RBC AUTO: 129.6 FL — HIGH (ref 80–100)
NRBC # BLD: 0 /100 WBCS — SIGNIFICANT CHANGE UP (ref 0–0)
PLATELET # BLD AUTO: 158 K/UL — SIGNIFICANT CHANGE UP (ref 150–400)
POTASSIUM SERPL-MCNC: 4.3 MMOL/L — SIGNIFICANT CHANGE UP (ref 3.5–5.3)
POTASSIUM SERPL-SCNC: 4.3 MMOL/L — SIGNIFICANT CHANGE UP (ref 3.5–5.3)
RBC # BLD: 2.23 M/UL — LOW (ref 3.8–5.2)
RBC # FLD: 16.1 % — HIGH (ref 10.3–14.5)
SODIUM SERPL-SCNC: 139 MMOL/L — SIGNIFICANT CHANGE UP (ref 135–145)
WBC # BLD: 3.31 K/UL — LOW (ref 3.8–10.5)
WBC # FLD AUTO: 3.31 K/UL — LOW (ref 3.8–10.5)

## 2019-09-30 PROCEDURE — 85025 COMPLETE CBC W/AUTO DIFF WBC: CPT

## 2019-09-30 PROCEDURE — 84443 ASSAY THYROID STIM HORMONE: CPT

## 2019-09-30 PROCEDURE — 71260 CT THORAX DX C+: CPT

## 2019-09-30 PROCEDURE — 83735 ASSAY OF MAGNESIUM: CPT

## 2019-09-30 PROCEDURE — 80061 LIPID PANEL: CPT

## 2019-09-30 PROCEDURE — 85045 AUTOMATED RETICULOCYTE COUNT: CPT

## 2019-09-30 PROCEDURE — 81001 URINALYSIS AUTO W/SCOPE: CPT

## 2019-09-30 PROCEDURE — 82553 CREATINE MB FRACTION: CPT

## 2019-09-30 PROCEDURE — 86340 INTRINSIC FACTOR ANTIBODY: CPT

## 2019-09-30 PROCEDURE — 85384 FIBRINOGEN ACTIVITY: CPT

## 2019-09-30 PROCEDURE — 86803 HEPATITIS C AB TEST: CPT

## 2019-09-30 PROCEDURE — 82985 ASSAY OF GLYCATED PROTEIN: CPT

## 2019-09-30 PROCEDURE — G0378: CPT

## 2019-09-30 PROCEDURE — 85730 THROMBOPLASTIN TIME PARTIAL: CPT

## 2019-09-30 PROCEDURE — 86900 BLOOD TYPING SEROLOGIC ABO: CPT

## 2019-09-30 PROCEDURE — 36415 COLL VENOUS BLD VENIPUNCTURE: CPT

## 2019-09-30 PROCEDURE — 85610 PROTHROMBIN TIME: CPT

## 2019-09-30 PROCEDURE — 84100 ASSAY OF PHOSPHORUS: CPT

## 2019-09-30 PROCEDURE — 96361 HYDRATE IV INFUSION ADD-ON: CPT

## 2019-09-30 PROCEDURE — 83880 ASSAY OF NATRIURETIC PEPTIDE: CPT

## 2019-09-30 PROCEDURE — 93970 EXTREMITY STUDY: CPT

## 2019-09-30 PROCEDURE — 83010 ASSAY OF HAPTOGLOBIN QUANT: CPT

## 2019-09-30 PROCEDURE — 85379 FIBRIN DEGRADATION QUANT: CPT

## 2019-09-30 PROCEDURE — 99239 HOSP IP/OBS DSCHRG MGMT >30: CPT

## 2019-09-30 PROCEDURE — 80053 COMPREHEN METABOLIC PANEL: CPT

## 2019-09-30 PROCEDURE — 82607 VITAMIN B-12: CPT

## 2019-09-30 PROCEDURE — 82550 ASSAY OF CK (CPK): CPT

## 2019-09-30 PROCEDURE — 70450 CT HEAD/BRAIN W/O DYE: CPT

## 2019-09-30 PROCEDURE — 83036 HEMOGLOBIN GLYCOSYLATED A1C: CPT

## 2019-09-30 PROCEDURE — 86850 RBC ANTIBODY SCREEN: CPT

## 2019-09-30 PROCEDURE — 96374 THER/PROPH/DIAG INJ IV PUSH: CPT | Mod: XU

## 2019-09-30 PROCEDURE — 85027 COMPLETE CBC AUTOMATED: CPT

## 2019-09-30 PROCEDURE — 84484 ASSAY OF TROPONIN QUANT: CPT

## 2019-09-30 PROCEDURE — 83615 LACTATE (LD) (LDH) ENZYME: CPT

## 2019-09-30 PROCEDURE — 82746 ASSAY OF FOLIC ACID SERUM: CPT

## 2019-09-30 PROCEDURE — 86901 BLOOD TYPING SEROLOGIC RH(D): CPT

## 2019-09-30 PROCEDURE — 93306 TTE W/DOPPLER COMPLETE: CPT

## 2019-09-30 PROCEDURE — 87040 BLOOD CULTURE FOR BACTERIA: CPT

## 2019-09-30 PROCEDURE — 80048 BASIC METABOLIC PNL TOTAL CA: CPT

## 2019-09-30 PROCEDURE — 99285 EMERGENCY DEPT VISIT HI MDM: CPT | Mod: 25

## 2019-09-30 PROCEDURE — 96375 TX/PRO/DX INJ NEW DRUG ADDON: CPT | Mod: XU

## 2019-09-30 PROCEDURE — 80307 DRUG TEST PRSMV CHEM ANLYZR: CPT

## 2019-09-30 PROCEDURE — 93005 ELECTROCARDIOGRAM TRACING: CPT

## 2019-09-30 PROCEDURE — 82962 GLUCOSE BLOOD TEST: CPT

## 2019-09-30 PROCEDURE — 97161 PT EVAL LOW COMPLEX 20 MIN: CPT

## 2019-09-30 PROCEDURE — 74177 CT ABD & PELVIS W/CONTRAST: CPT

## 2019-09-30 PROCEDURE — 83605 ASSAY OF LACTIC ACID: CPT

## 2019-10-01 ENCOUNTER — EMERGENCY (EMERGENCY)
Facility: HOSPITAL | Age: 69
LOS: 1 days | Discharge: HOME | End: 2019-10-01
Attending: EMERGENCY MEDICINE | Admitting: EMERGENCY MEDICINE
Payer: MEDICARE

## 2019-10-01 VITALS
SYSTOLIC BLOOD PRESSURE: 135 MMHG | TEMPERATURE: 99 F | DIASTOLIC BLOOD PRESSURE: 87 MMHG | RESPIRATION RATE: 16 BRPM | OXYGEN SATURATION: 98 % | HEIGHT: 68 IN | WEIGHT: 160.06 LBS | HEART RATE: 91 BPM

## 2019-10-01 VITALS
SYSTOLIC BLOOD PRESSURE: 161 MMHG | OXYGEN SATURATION: 100 % | HEART RATE: 81 BPM | RESPIRATION RATE: 16 BRPM | DIASTOLIC BLOOD PRESSURE: 94 MMHG | TEMPERATURE: 98 F

## 2019-10-01 DIAGNOSIS — Z98.890 OTHER SPECIFIED POSTPROCEDURAL STATES: Chronic | ICD-10-CM

## 2019-10-01 PROCEDURE — 96372 THER/PROPH/DIAG INJ SC/IM: CPT

## 2019-10-01 PROCEDURE — 99283 EMERGENCY DEPT VISIT LOW MDM: CPT | Mod: 25

## 2019-10-01 PROCEDURE — 99284 EMERGENCY DEPT VISIT MOD MDM: CPT

## 2019-10-01 RX ORDER — PREGABALIN 225 MG/1
1 CAPSULE ORAL
Qty: 7 | Refills: 0
Start: 2019-10-01 | End: 2019-10-07

## 2019-10-01 RX ORDER — PREGABALIN 225 MG/1
100 CAPSULE ORAL ONCE
Refills: 0 | Status: COMPLETED | OUTPATIENT
Start: 2019-10-01 | End: 2019-10-01

## 2019-10-01 RX ADMIN — PREGABALIN 100 MICROGRAM(S): 225 CAPSULE ORAL at 17:58

## 2019-10-01 NOTE — ED PROVIDER NOTE - CARE PROVIDER_API CALL
Niyah Johnston)  Cardiovascular Disease; Internal Medicine  [address] [id number]  Phone: [phone]  Fax: [phone]  Follow Up Time:

## 2019-10-01 NOTE — ED CLERICAL - NS ED CLERK NOTE PRE-ARRIVAL INFORMATION; ADDITIONAL PRE-ARRIVAL INFORMATION
This patient is enrolled in the readmission program and has active care navigation. This patient can be followed up by the care navigation team within 24 hours. To arrange close follow-up or to obtain additional clinical information about this patient, please call the contact number above.,

## 2019-10-01 NOTE — ED PROVIDER NOTE - OBJECTIVE STATEMENT
68 y/o female with a PMHx of anemia is present here in the ER who states she was called today to come to the ED for critical vitamin b12 levels. Pt states she was admitted to Cardiology and signed out AMA yesterday because she had things to do. Pt here in the ED without any complaints. She denies the following: confusion, HA, dizziness, sob, chest pain, numbness/tingling of extremities, N/V.

## 2019-10-01 NOTE — ED PROVIDER NOTE - CLINICAL SUMMARY MEDICAL DECISION MAKING FREE TEXT BOX
70 y/o female here in the ED who states she was called in for low vitamin b12 levels and was advised to come to the ED for an injection. Pt is very upset that she is here and only wants the injection and wants to leave asap. Labs evaluated as pt here in the ED with nml VS and asymptomatic. Discussed with Dr. Johnston, plan for injection and dc as pt without complaints. Spoke to Merlyn from Clinical Call Center to establish close follow-up care to ensure compliancy. Pt given strict return precautions. 70 y/o female here in the ED who states she was called in for low vitamin b12 levels and was advised to come to the ED for an injection. Verified with CM regarding pt being present in the ER. Pt is very upset that she is here and only wants the injection and wants to leave asap. Labs evaluated as pt here in the ED with nml VS and asymptomatic. Discussed with Dr. Johnston, plan for injection and dc as pt without complaints. Spoke to Merlyn from Clinical Call Center to establish close follow-up care to ensure compliancy. Pt given strict return precautions. 70 y/o female here in the ED who states she was called in for low vitamin b12 levels and was advised to come to the ED for an injection. Verified with CM regarding pt being present in the ER. Pt is very upset that she is here and only wants the injection and wants to leave asap. Labs evaluated as pt here in the ED with nml VS and asymptomatic without neurological deficits and no symptoms of anemia. Discussed with Dr. Johnston, plan for injection and dc as pt without complaints. Pt advised to follow up regarding rpt hgb and b12 levels to ensure therapeutic effect. Pt has a hx of abdominal surgery? (pt does not know what occurred x10 years ago) - strictly advised close follow-up due to unknown possible malabsorption of vitamin b12. Pt demonstrates a clear understanding of plan and agrees to follow-up. Spoke to Merlyn from Clinical Call Center to establish close follow-up care to ensure compliancy. Pt given strict return precautions

## 2019-10-01 NOTE — ED ADULT TRIAGE NOTE - OTHER COMPLAINTS
patient sent by PCP "they said my blood count was low"--patient cannot recall value---denies dizziness/lightheadedness/SOB/CP at present

## 2019-10-01 NOTE — ED PROVIDER NOTE - PATIENT PORTAL LINK FT
You can access the FollowMyHealth Patient Portal offered by [facility] by registering at the following website: [email or web address]. By joining [employer]’s FollowMyHealth portal, you will also be able to view your health information using other applications (apps) compatible with our system.

## 2019-10-01 NOTE — ED PROVIDER NOTE - NSFOLLOWUPINSTRUCTIONS_ED_ALL_ED_FT
Please follow up with your PMD for repeat blood work regarding your low Vitamin B12 levels.     Return to the Emergency Department if you have any new or worsening symptoms, or if you have any concerns.    Vitamin B12 Deficiency  Vitamin B12 deficiency occurs when the body does not have enough vitamin B12. Vitamin B12 is an important vitamin. The body needs vitamin B12:  To make red blood cells.To make DNA. This is the genetic material inside cells.To help the nerves work properly so they can carry messages from the brain to the body.Vitamin B12 deficiency can cause various health problems, such as a low red blood cell count (anemia) or nerve damage.  What are the causes?  This condition may be caused by:  Not eating enough foods that contain vitamin B12.Not having enough stomach acid and digestive fluids to properly absorb vitamin B12 from the food that you eat.Certain digestive system diseases that make it hard to absorb vitamin B12. These diseases include Crohn disease, chronic pancreatitis, and cystic fibrosis.Pernicious anemia. This is a condition in which the body does not make enough of a protein (intrinsic factor), resulting in too few red blood cells.Having a surgery in which part of the stomach or small intestine is removed.Taking certain medicines that make it hard for the body to absorb vitamin B12. These medicines include:  Heartburn medicine (antacids and proton pump inhibitors).An antibiotic medicine called neomycin.Some medicines that are used to treat diabetes, tuberculosis, gout, or high cholesterol.What increases the risk?  The following factors may make you more likely to develop a B12 deficiency:  Being older than age 50.Eating a vegetarian or vegan diet, especially while you are pregnant.Eating a poor diet while you are pregnant.Taking certain drugs.Having alcoholism.What are the signs or symptoms?  In some cases, there are no symptoms of this condition. If the condition leads to anemia or nerve damage, various symptoms can occur, such as:  Weakness.Fatigue.Loss of appetite.Weight loss.Numbness or tingling in your hands and feet.Redness and burning of the tongue.Confusion or memory problems.Depression.Sensory problems, such as color blindness, ringing in the ears, or loss of taste.Diarrhea or constipation.Trouble walking.If anemia is severe, symptoms can include:Image  Shortness of breath.Dizziness.Rapid heart rate (tachycardia).How is this diagnosed?  This condition may be diagnosed with a blood test to measure the level of vitamin B12 in your blood. You may have other tests to help find the cause of your vitamin B12 deficiency. These tests may include:  A complete blood count (CBC). This is a group of tests that measure certain characteristics of blood cells.A blood test to measure intrinsic factor.An endoscopy. In this procedure, a thin tube with a camera on the end is used to look into your stomach or intestines.How is this treated?  Treatment for this condition depends on the cause. Common treatment options include:  Changing your eating and drinking habits, such as:  Eating more foods that contain vitamin B12.Drinking less alcohol or no alcohol.Taking vitamin B12 supplements. Your health care provider will tell you which dosage is best for you.Getting vitamin B12 injections.Follow these instructions at home:  Take supplements only as told by your health care provider. Follow the directions carefully.Get any injections that are prescribed by your health care provider. Do not miss your appointments.Eat lots of healthy foods that contain vitamin B12. Ask your health care provider if you should work with a dietitian. Foods that contain vitamin B12 include:  Meat.Meat from birds (poultry).Fish.Eggs.Cereal and dairy products that are fortified. This means that vitamin B12 has been added to the food. Check the label on the package to see if the food is fortified.Do not abuse alcohol.Keep all follow-up visits as told by your health care provider. This is important.Contact a health care provider if:  Your symptoms come back.Get help right away if:  You develop shortness of breath.You have chest pain.You become dizzy or you lose consciousness.This information is not intended to replace advice given to you by your health care provider. Make sure you discuss any questions you have with your health care provider.    Document Released: 03/11/2013 Document Revised: 05/31/2017 Document Reviewed: 05/04/2016  [employer] Patient Education © 2019 [employer]

## 2019-10-01 NOTE — ED ADULT NURSE NOTE - NS TRANSFER PATIENT BELONGINGS
Clothing
Quality 226: Preventive Care And Screening: Tobacco Use: Screening And Cessation Intervention: Patient screened for tobacco use and is an ex/non-smoker
Detail Level: Detailed
Quality 431: Preventive Care And Screening: Unhealthy Alcohol Use - Screening: Patient screened for unhealthy alcohol use using a single question and scores less than 2 times per year

## 2019-10-01 NOTE — ED PROVIDER NOTE - ATTENDING CONTRIBUTION TO CARE
Well appearing patient in NAD here for lab check. No other complaints at this time. Suggest coordinating with GI/sx for B12 IM vs PO as outpatient to prevent unnecessary ED visits. F/U with them for outpt recs.

## 2019-10-02 PROBLEM — I49.8 OTHER SPECIFIED CARDIAC ARRHYTHMIAS: Chronic | Status: ACTIVE | Noted: 2019-09-28

## 2019-10-03 LAB
CULTURE RESULTS: SIGNIFICANT CHANGE UP
CULTURE RESULTS: SIGNIFICANT CHANGE UP
SPECIMEN SOURCE: SIGNIFICANT CHANGE UP
SPECIMEN SOURCE: SIGNIFICANT CHANGE UP

## 2019-10-06 DIAGNOSIS — E53.8 DEFICIENCY OF OTHER SPECIFIED B GROUP VITAMINS: ICD-10-CM

## 2019-10-06 DIAGNOSIS — R79.9 ABNORMAL FINDING OF BLOOD CHEMISTRY, UNSPECIFIED: ICD-10-CM

## 2019-10-09 DIAGNOSIS — R55 SYNCOPE AND COLLAPSE: ICD-10-CM

## 2019-10-09 DIAGNOSIS — N17.9 ACUTE KIDNEY FAILURE, UNSPECIFIED: ICD-10-CM

## 2019-10-09 DIAGNOSIS — N18.9 CHRONIC KIDNEY DISEASE, UNSPECIFIED: ICD-10-CM

## 2019-10-09 DIAGNOSIS — Z82.49 FAMILY HISTORY OF ISCHEMIC HEART DISEASE AND OTHER DISEASES OF THE CIRCULATORY SYSTEM: ICD-10-CM

## 2019-10-09 DIAGNOSIS — N28.89 OTHER SPECIFIED DISORDERS OF KIDNEY AND URETER: ICD-10-CM

## 2019-10-09 DIAGNOSIS — J18.1 LOBAR PNEUMONIA, UNSPECIFIED ORGANISM: ICD-10-CM

## 2019-10-09 DIAGNOSIS — D53.9 NUTRITIONAL ANEMIA, UNSPECIFIED: ICD-10-CM

## 2024-10-11 NOTE — PATIENT PROFILE ADULT - FALLEN IN THE PAST
Post procedure call completed    Bleeding: _____yes __X___no (pt denies)    Pain: _____yes ___X___no (pt denies, used ice, took Advil x1)    Redness/Swelling: ______yes ___X___no (pt denies)    Band aid removed: ___X__yes _____no     Pt with no questions at this time, adv to call with any questions or concerns, has name/# for contact   yes
